# Patient Record
Sex: MALE | URBAN - METROPOLITAN AREA
[De-identification: names, ages, dates, MRNs, and addresses within clinical notes are randomized per-mention and may not be internally consistent; named-entity substitution may affect disease eponyms.]

---

## 2017-04-06 ENCOUNTER — TRANSFERRED RECORDS (OUTPATIENT)
Dept: HEALTH INFORMATION MANAGEMENT | Facility: CLINIC | Age: 67
End: 2017-04-06

## 2017-04-28 ENCOUNTER — TRANSFERRED RECORDS (OUTPATIENT)
Dept: HEALTH INFORMATION MANAGEMENT | Facility: CLINIC | Age: 67
End: 2017-04-28

## 2017-08-16 ENCOUNTER — TELEPHONE (OUTPATIENT)
Dept: FAMILY MEDICINE | Facility: CLINIC | Age: 67
End: 2017-08-16

## 2017-08-16 NOTE — TELEPHONE ENCOUNTER
Apryl called from [phone] regarding a patient needing an assessment to Establish Care with an Primary Provider for a [facility] in [facility]. Patient can leave at anytime once primary care has been set up. Apryl would like a call back.      Deborah Cano MA

## 2017-08-16 NOTE — TELEPHONE ENCOUNTER
Spoke with Apryl who states TC was waiting to hear back on which  patient was going to be admitted to before scheduling assessment.      Patient will be admitted to [facility] in [place].    Routing to .    Melody Goldstein RN

## 2017-08-21 ENCOUNTER — TELEPHONE (OUTPATIENT)
Dept: FAMILY MEDICINE | Facility: CLINIC | Age: 67
End: 2017-08-21

## 2017-08-21 NOTE — TELEPHONE ENCOUNTER
Mera from [facility] in [place] called to check on the status of scheduling an assessment for this patient. Please call. Phone # [phone]    Moira Gresham  [facility]

## 2017-08-21 NOTE — TELEPHONE ENCOUNTER
Patient currently at [facility] and is not a [facility] patient. The [facility] provider would like him to have primary care set up prior to discharging him to [facility].  Okay per Sabina Sahu NP, to do assessment at [facility].  Scheduled assessment with Mera from [facility], who is going to inform pt and [facility] staff of the plan.  Mera will also be at the assessment on 8/29.    See paper records from [place] and [facility] in [facility] office.    Mera said she call back with the pt's room number at [facility] before our visit next week.    Mariaelena Greer RN

## 2017-08-29 ENCOUNTER — ALLIED HEALTH/NURSE VISIT (OUTPATIENT)
Dept: FAMILY MEDICINE | Facility: CLINIC | Age: 67
End: 2017-08-29
Payer: MEDICARE

## 2017-08-29 DIAGNOSIS — J44.9 COPD (CHRONIC OBSTRUCTIVE PULMONARY DISEASE) (H): Primary | ICD-10-CM

## 2017-08-29 DIAGNOSIS — J96.10 CHRONIC RESPIRATORY FAILURE (H): ICD-10-CM

## 2017-08-29 PROCEDURE — 99207 ZZC NO CHARGE NURSE ONLY: CPT

## 2017-08-29 NOTE — Clinical Note
This pt was at [facility] for months, transferred to [facility] and is discharging to [facility] in [facility] next week. We have some records from [facility] and have requested records from [facility], but before that the pt reports not having much primary care.  Mariaelena

## 2017-08-29 NOTE — MR AVS SNAPSHOT
After Visit Summary   8/29/2017    Vlad Gleason    MRN: [id number]           Patient Information     Date Of Birth          1950        Visit Information        Provider Department      8/29/2017 10:00 AM COCC NURSE [facility]        Today's Diagnoses     COPD (chronic obstructive pulmonary disease) (H)    -  1    Chronic respiratory failure (H)           Follow-ups after your visit        Your next 10 appointments already scheduled     Sep 11, 2017  9:45 AM CDT   New Visit with BUBBA Owens CNP   [facility] ([facility])    [address] [place] [address]  [address] [place] [address] [id number] [phone]            Sep 11, 2017  9:45 AM CDT   New Visit with Corinne E Melling, LMFT   [facility] ([facility])    [address] [place] [address]  [address] [place] [address] [id number] [phone]            Sep 14, 2017  2:00 PM CDT   Office Visit with Lilia Rojas [facility] ([facility])    [address] [place] [id number] [phone]           Bring a current list of meds and any records pertaining to this visit. For Physicals, please bring immunization records and any forms needing to be filled out. Please arrive 10 minutes early to complete paperwork.              Who to contact     If you have questions or need follow up information about today's clinic visit or your schedule please contact [facility] directly at [phone].  Normal or non-critical lab and imaging results will be communicated to you by MyChart, letter or phone within 4 business days after the clinic has received the results. If you do not hear from us within 7 days, please contact the clinic through MyChart or phone. If you have a critical or abnormal lab result, we will notify you by phone as soon as possible.  Submit refill requests  "through [employer] or call your pharmacy and they will forward the refill request to us. Please allow 3 business days for your refill to be completed.          Additional Information About Your Visit        Home Delivery Service (HDS)hart Information     [employer] lets you send messages to your doctor, view your test results, renew your prescriptions, schedule appointments and more. To sign up, go to [email or web address] . Click on \"Log in\" on the left side of the screen, which will take you to the Welcome page. Then click on \"Sign up Now\" on the right side of the page.     You will be asked to enter the access code listed below, as well as some personal information. Please follow the directions to create your username and password.     Your access code is: [email or web address]  Expires: 2017 12:23 PM     Your access code will  in 90 days. If you need help or a new code, please call your [facility] clinic or [phone].        Care EveryWhere ID     This is your Care EveryWhere ID. This could be used by other organizations to access your [facility] medical records  [id number]         Blood Pressure from Last 3 Encounters:   No data found for BP    Weight from Last 3 Encounters:   No data found for Wt              Today, you had the following     No orders found for display       Primary Care Provider    None Specified       No primary provider on file.        Equal Access to Services     REBECCA VALVERDE : Hadii german [place], waaxda luqadaha, qaybta kaalmada dorinda, ashanti llamas . So [facility] [phone].    ATENCIÓN: Si habla español, tiene a conway disposición servicios gratuitos de asistencia lingüística. Llame al [phone].    We comply with applicable federal civil rights laws and [place] laws. We do not discriminate on the basis of race, color, national origin, age, disability sex, sexual orientation or gender identity.            Thank you!     Thank you for choosing [facility] "  for your care. Our goal is always to provide you with excellent care. Hearing back from our patients is one way we can continue to improve our services. Please take a few minutes to complete the written survey that you may receive in the mail after your visit with us. Thank you!             Your Updated Medication List - Protect others around you: Learn how to safely use, store and throw away your medicines at www.disposemymeds.org.      Notice  As of 8/29/2017 12:23 PM    You have not been prescribed any medications.

## 2017-08-29 NOTE — PROGRESS NOTES
"SUBJECTIVE:                                                      Vlad Gleason is a 66-year-old male being seen at home for an RN/SW assessment to determine eligibility for the mobile Complex Care Clinic.      Patient was seen along with Mera, RN case manager, and Kathy, Director of Nursing, both from [facility].    Top medical concerns:   1. \"Bed sores on bottom\"  2. Open area on right flank. \"Did not heal after gall bladder surgery about a year ago\"     HOMEBOUND STATUS:  Patient needs the aid of supportive devices or assistance of others  Unable to leave home without considerable, taxing effort    FUNCTIONAL STATUS:  WHODAS:   WHODAS 2.0 TOTAL SCORES 8/29/2017   Total Score 41       Standing & walking - cannot bear weight  Eating - dysphagia, history of aspiration  Bathing - bed baths  Dressing - total assist  Toileting - supra-pubic catheter    Medications - nurses at facility manage meds    Managing finances - Vlad manages on his own, but would like assistance. Mera said she can assist him to establish a rep payee    Transportation: used to use medical transportation, including stretcher transport, before he was on a ventilator  Home Care: [facility] group home in [place] - 24/7 nursing care  Hearing and Vision: Patient wears corrective lenses  Safety: No falls reported  DME: Hospital Bed, [place], O2, Power chair, Ramps in use at home, W/C, cough assist, suction, vent  O2 and supplies through [facility]  Vent (patient reports he is only on vent at night     Fall Risk for Medicare Annual Wellness Home safety: Fallen 2 or more times in the past year?: No  Any fall with injury in the past year?: No      PSYCHOSOCIAL: Legal guardian/conservator/POA: currently independent. Would like his nephew, Breezy, to be his POA    Financial: [place] was starting process for rep payee and [facility] will help with that going forward  Insurance: Manda JIMÉNEZ  Communication Barrier: " N/A  Cultural/Spiritual Preferences: Christianity  Support System Patient/Caregivers: Nephew Breezy and his sister (pt's niece)  Living Situation: At [facility] now, hoping to discharge to [facility] in [facility] Care Team: none  Other: Patient does not have a waiver or any county assistance, per [facility] and [facility] nurses        MENTAL HEALTH/COGNITION:   Vlad has a history of anxiety and depression, but denies current issues with anxiety or worries.  He did endorse feeling down, sad and hopeless sometimes, but denies suicidal thoughts today.      Six Item Cognitive Impairment Test   (6CIT):      What year is it?                               Correct - 0 points    What month is it?                               Correct - 0 points      Give the patient an address to remember with five components:   [place] ( first and last name - 2 components)   [address]  (number and name of street - 2 components)   [place] ( city - 1 component)      About what time is it (within the hour)? Correct - 0 points    Count backwards from 20 to 1:   Correct - 0 points    Say the months of the year in reverse: Correct - 0 points    Repeat the address phrase:   1 error - 2 points    Total 6CIT Score:      2/28    Interpretation: The 6CIT uses an inverse score and questions are weighted to produce a total out of 28. Scores of 0-7 are considered normal and 8 or more significant.    Advantages The test has high sensitivity without compromising specificity even in mild dementia. It is easy to translate linguistically and culturally.  Disadvantages The main disadvantage is in the scoring and weighting of the test, which is initially confusing, however computer models have simplified this greatly.    Probability Statistics: At the 7/8 cut off: Overall figures sensitivity 90% specificity 100%, in mild dementia sensitivity = 78% , specificity = 100%    Copyright 2000 [facility], [facility], [place].  Courtesy of Dr. Todd Cole      Specialists:  none      HEALTH CARE GOALS:    Do you have a POLST? No: POLST reviewed with patient; information given to patient to review.  Do you have a Health Care Directive?: No: Advance care planning reviewed with patient; information given to patient to review.  There is no problem list on file for this patient.      No current outpatient prescriptions on file.      ASSESSMENT/PLAN:                                                      Due to the patient's medical and/or and behavioral health complexity they would require regularly scheduled primary care visits every 1-3 months LIST:  YES       Homebound Status:  Comment: Homebound  Plan: Complex Care Clinic    Functional status:  Comment: Needs assistance with all ADLs & IADLs   Plan: 24/7 care at [facility]    Mental health/Cognition:  Comment: No cognitive issues noted or reported. Depression & anxiety reported in [facility] notes. PHQ & JOSEFINA deferred to [facility] visit.  Pt denies suicidal thoughts today.  Plan: [facility] at first visit and as needed    Health Care Goals:  Comment: No health care directive. Patient would like to work on this with his nephew soon.  Left Honoring Choices information with patient today.  Plan: Have goals of care conversations at upcoming visits. Currently Full code       The patient qualifies for the Complex Care Clinic and is scheduled to establish care with Josie Bowman NP, on 9/11/17.  He/She will be reassessed in six months to determine ongoing eligibility for Complex Care services.

## 2017-08-29 NOTE — TELEPHONE ENCOUNTER
Assessment done today and the pt qualifies for Complex Care. He is scheduled to establish with Josie Bowman NP, on 9/11.    Mariaelena Greer RN

## 2017-12-13 ENCOUNTER — MEDICAL CORRESPONDENCE (OUTPATIENT)
Dept: HEALTH INFORMATION MANAGEMENT | Facility: CLINIC | Age: 67
End: 2017-12-13

## 2017-12-19 ENCOUNTER — DOCUMENTATION ONLY (OUTPATIENT)
Dept: FAMILY MEDICINE | Facility: CLINIC | Age: 67
End: 2017-12-19

## 2017-12-19 ENCOUNTER — TELEPHONE (OUTPATIENT)
Dept: FAMILY MEDICINE | Facility: CLINIC | Age: 67
End: 2017-12-19

## 2017-12-19 DIAGNOSIS — T81.89XS NON-HEALING SURGICAL WOUND, SEQUELA: ICD-10-CM

## 2017-12-19 DIAGNOSIS — J95.02 INFECTION OF TRACHEOSTOMY STOMA (H): Primary | ICD-10-CM

## 2017-12-19 DIAGNOSIS — J96.11 CHRONIC RESPIRATORY FAILURE WITH HYPOXIA AND HYPERCAPNIA (H): ICD-10-CM

## 2017-12-19 DIAGNOSIS — S31.000S WOUND OF SACRAL REGION, SEQUELA: ICD-10-CM

## 2017-12-19 DIAGNOSIS — R33.9 URINARY RETENTION: ICD-10-CM

## 2017-12-19 DIAGNOSIS — J96.12 CHRONIC RESPIRATORY FAILURE WITH HYPOXIA AND HYPERCAPNIA (H): ICD-10-CM

## 2017-12-19 DIAGNOSIS — F33.3 SEVERE EPISODE OF RECURRENT MAJOR DEPRESSIVE DISORDER, WITH PSYCHOTIC FEATURES (H): ICD-10-CM

## 2017-12-19 DIAGNOSIS — R13.10 DYSPHAGIA, UNSPECIFIED TYPE: ICD-10-CM

## 2017-12-19 NOTE — TELEPHONE ENCOUNTER
[facility] Family Medicine phone call message- general phone call:    Reason for call: Dr Mack and Dr Calero did a home visit today for pt, [facility]  would like clarification on the orders they left today. Please call [facility] at [phone].    Return call needed: Yes    OK to leave a message on voice mail? Yes    Primary language: English      needed? No    Call taken on December 19, 2017 at 12:47 PM by Anaid Bautista

## 2017-12-20 ENCOUNTER — TELEPHONE (OUTPATIENT)
Dept: FAMILY MEDICINE | Facility: CLINIC | Age: 67
End: 2017-12-20

## 2017-12-20 PROBLEM — T81.89XA SURGICAL WOUND, NON HEALING: Status: ACTIVE | Noted: 2017-12-20

## 2017-12-20 PROBLEM — I48.0 PAROXYSMAL ATRIAL FIBRILLATION (H): Status: ACTIVE | Noted: 2017-12-20

## 2017-12-20 PROBLEM — R33.9 URINARY RETENTION: Status: ACTIVE | Noted: 2017-12-20

## 2017-12-20 PROBLEM — F32.3 MAJOR DEPRESSIVE DISORDER WITH PSYCHOTIC FEATURES (H): Status: ACTIVE | Noted: 2017-12-20

## 2017-12-20 PROBLEM — J96.11 CHRONIC RESPIRATORY FAILURE WITH HYPOXIA AND HYPERCAPNIA (H): Status: ACTIVE | Noted: 2017-12-20

## 2017-12-20 PROBLEM — R13.10 DYSPHAGIA: Status: ACTIVE | Noted: 2017-12-20

## 2017-12-20 PROBLEM — S31.000A SACRAL WOUND: Status: ACTIVE | Noted: 2017-12-20

## 2017-12-20 PROBLEM — E66.2 OBESITY HYPOVENTILATION SYNDROME (H): Status: ACTIVE | Noted: 2017-12-20

## 2017-12-20 PROBLEM — J96.12 CHRONIC RESPIRATORY FAILURE WITH HYPOXIA AND HYPERCAPNIA (H): Status: ACTIVE | Noted: 2017-12-20

## 2017-12-20 NOTE — TELEPHONE ENCOUNTER
[facility] Family Medicine phone call message- general phone call:    Reason for call: They need clarification on a prescription that was sent over yesterday.     Return call needed: Yes    OK to leave a message on voice mail? Yes    Primary language: English      needed? No    Call taken on December 20, 2017 at 3:08 PM by Maribeth Cai

## 2017-12-20 NOTE — TELEPHONE ENCOUNTER
Called [facility] pharmacy and clarified orders from home visit on 12/19/17.  See the documentation for that encounter for further details.    Ximena Calero MD PGY-3  [facility]  12/20/2017

## 2017-12-20 NOTE — PROGRESS NOTES
Subjective: Vlad Gleason is a 67 year old who is seen at home for follow up visit today.  Seen at [address] [place] [address] .    Also present at visit include home nurse Melisa.    Acute concerns today include:   Patient is a new patient to [facility] and home visits.  He has been on a ventilator since October 2016, after which she was discharged to [facility] for long-term rehabilitation in October 2016.  He stated [facility] until July 2017, at which time he was transferred to [facility] for third further management.  He was discharged from [facility] on December 12, 2017 2 his current living situation.  He receives home care at the [facility] in which she is currently staying.    Nursing staff would like us to evaluate him today.  Their main concern is there is an infection at his tracheostomy site.  They note that patient has had increased drainage and erythema surrounding the tracheostomy for the last couple of days.  The drainage is greenish yellow in color and is thick.  The patient denies any fevers.  They have been having to clean out the tracheostomy more frequently than usual.  Is unclear if the patient has an ear nose and throat physician or pulmonologist.    Other concerns is the need more medications for his anxiety.  Patient has a history of severe depression with some psychotic features.  It was noted in his discharge summary from health [facility] that he was at times encephalopathic.  His current home regimen includes Seroquel, melatonin, venlafaxine.  Nursing staff is wondering if they can have a as needed medication for the patient's anxiety.  Patient states that he does feel worried often and that it inhibits his daily living.    Lastly they need more medications for his wounds.  Patient has a sacral decubitus ulcer due to his mobility.  He also has an nonhealing surgical wound on the right side of his chest that has been there for quite some to time.  There is  no evidence of infection at these sites at this time.  Chronic and Past Medical Problems include:  Patient Active Problem List   Diagnosis     Chronic respiratory failure with hypoxia and hypercapnia (H)     Obesity hypoventilation syndrome (H)     Dysphagia     Urinary retention     Sacral wound     Major depressive disorder with psychotic features (H)     Paroxysmal atrial fibrillation (H)     Surgical wound, non healing     Social History:   Current activities:  New to the group [place], recently moved here from [facility]  Support services:  Receives 24 hour care at [facility]  PMHX/PSHX/MEDS/ALLERGIES/SHX/FHX reviewed and updated in Epic.   MEDICATION LIST:  Will be scanned into the chart once receive the records from his [facility] with updated medication list.  Medications are managed by:  Nursing staff  Patient uses a pill box to manage their medications:  Yes   Patient has questions, concerns, or potential side effects/interactions from their medications:  Yes  GERIATRIC ROS:    Do you have difficulty getting around, watching TV or reading because of poor eyesight? No, immobile but not due to eye sight  Can you hear normal conversational voice? Yes   Do you use hearing aides? No   Do you have problems with your memory? No   Do you often feel sad or depressed? Yes   Have you unintentionally lost weight in the last 6 months?  No   Do you have trouble with control of your bladder? Has suprapubic catheter  Do you have trouble with control of your bowels?  No   Have you had any falls in the past year? No     GENERAL ROS:   General: No unexplained weight gain or loss; adequate sleep pattern.  Resp: No worsening shortness of breath, cough or hemoptysis.   GI: No worsening constipation, no diarrhea, no nausea or vomiting  : Voiding independently with no significant incontinence   Skin:   Concern for skin around tracheostomy site. No  new areas of new skin breakdown or worrisome rashes  Extremities:  No pain,  extremity weakness or balance troubles    Objective:   124/68, Pulse 64, Temp 97.6, O2 93% on room air (speaking valve)  Gen: Laying in bed, no acute distress   HEENT: Head is atraumatic. Tracheostomy with very mild erythema of the skin surrounding the stoma.  Minimal amount of yellowish drainage of the skin surrroudning the stoma.  CV: RRR - no murmurs, rubs, or gallops,   Pulm: CTAB, no wheezes/rales/rhonchi, good air entry   ABD: soft, nontender, BS intact  Extrem: no cyanosis, edema or clubbing   Psych: Euthymic, but does admit to anxiety  Neuro: Bedbound    Preventative Screening:   Vaccinations reviewed and up to date Not available, will need to sign care everywhere for [facility]  Get up and Go test:  Not applicable  Additional Recommended Screening: None     Assessment/ Plan:  (J95.02) Infection of tracheostomy stoma (H)  (primary encounter diagnosis)  Comment: Patient has had a tracheostomy since October 2016.  He has no previous history of complication with the tracheostomy.  However staff noted some erythema and breakdown of the skin surrounding the stoma for the last 2 days.  They have also noted some plugging of the stoma.  Prior evaluation, it appears to be a superficial infection of the skin surrounding the stoma rather than on the stoma itself.  The case was discussed with an on-call ENT physician who agreed that it was unlikely that the stoma was actually infected.  Plan: We will treat topically with 0.25% acetic acid soaked dressings.  Dressings are to be changed 3 times daily and continued for 1 week..  If this does not resolve the superficial infection by December 26, will start Keflex 500 mg 3 times daily ×7 days    (J96.11,  J96.12) Chronic respiratory failure with hypoxia and hypercapnia (H)  Comment: Patient has chronic respiratory failure due to obesity hypoventilation syndrome as well as obstructive sleep apnea.  He has been mechanically ventilated at night since October 2016.  He is  currently stable on his ventilator.  He uses a speaking valve during the day and does not require oxygen supplementation during the day.   Plan:  Continue current vent settings    (F33.3) Severe episode of recurrent major depressive disorder, with psychotic features (H)  Comment: Patient has known depression and current home regimen includes Seroquel 100 mg at bedtime, melatonin, venlafaxine.  He has had issues with anxiety since being admitted and staff are asking for an as needed medication.  Plan: Continue Seroquel, melatonin, venlafaxine at current doses.  Will add hydroxyzine 25-50 mg 4 times daily as needed for anxiety    (R13.10) Dysphagia, unspecified type  Comment: Patient is supposed to be on a thickened liquid diet, however he is noncompliant with this.  Plan: Continue to encourage compliance with thickened liquids    (R33.9) Urinary retention  Comment: Patient has a history of urinary retention and currently has a suprapubic catheter in place.  Catheter appears to be functioning normally at this time  Plan: Routine catheter care    (T81.89XS) Non-healing surgical wound, sequela  (S31.000S) Wound of sacral region, sequela  Comment: Patient has decubitus ulcer on the sacrum as well as a nonhealing surgical wound.  Nursing staff have multiple recommendations for wound care.  Orders were placed at the facility.  Plan: Continue wound care per nursing staff.  Will need care everywhere signed from [facility] at that time.  We will also need to complete rest of  RECOMMENDED FOLLOW UP:  1 month  History such as family history, social history.  There was not time to complete all of this today given the number of orders that we needed to sort through and dealing with the stoma infection.    Level of Service:  [id number]    Staffed with Dr. Mack.    Ximena Calero MD PGY-3  [facility]  12/19/2017

## 2017-12-21 ENCOUNTER — TELEPHONE (OUTPATIENT)
Dept: FAMILY MEDICINE | Facility: CLINIC | Age: 67
End: 2017-12-21

## 2017-12-21 NOTE — TELEPHONE ENCOUNTER
Silvercel (for pt's wound) is not covered by his insurance. Do you want to send an alternative? Per nurseSumaya.    Routed to Dr. Mack and Dr. Thompson. /MONICA Salazar

## 2017-12-21 NOTE — TELEPHONE ENCOUNTER
[facility] Family Medicine phone call message- general phone call:    Reason for call: His insurance does not cover one of his wound care kits.Please give a call back.    Return call needed: Yes    OK to leave a message on voice mail? Yes    Primary language: English      needed? No    Call taken on December 21, 2017 at 10:37 AM by Latricia Mercado

## 2017-12-21 NOTE — PROGRESS NOTES
Preceptor attestation:  Patient seen and discussed with the resident. Assessment and plan reviewed with resident and agreed upon.  Supervising physician: Jonathan Mack  [facility]

## 2017-12-22 NOTE — TELEPHONE ENCOUNTER
I called [facility] and spoke to pharmacist.  Clarified the order for Vistaril prn anxiety.  Still some confusion on the ordered spray cleanser to wounds and prn saline nebs.    MB

## 2017-12-27 ENCOUNTER — TELEPHONE (OUTPATIENT)
Dept: FAMILY MEDICINE | Facility: CLINIC | Age: 67
End: 2017-12-27

## 2017-12-27 NOTE — TELEPHONE ENCOUNTER
[facility] Family Medicine phone call message- general phone call:    Reason for call: The pharmacy said the hydroxyzine is not covered by insurance they need something else.    Return call needed: Yes    OK to leave a message on voice mail? Yes    Primary language: English      needed? No    Call taken on December 27, 2017 at 3:48 PM by Latricia Mercado

## 2017-12-29 ENCOUNTER — TELEPHONE (OUTPATIENT)
Dept: FAMILY MEDICINE | Facility: CLINIC | Age: 67
End: 2017-12-29

## 2017-12-30 NOTE — TELEPHONE ENCOUNTER
Answering service page:    Olena Peralta at [facility] with a phone number of [phone]  Reason for call: Need for emergent prescription for narcotic    Called Olean back at above number who reported the patient will be running out of his oxycodone prescription prior to Tuesday.  She is requesting to have this refilled so that they do not go the holiday weekend without his as needed pain medication.  Discussed with her the normal protocol with respect to narcotics being called in overnight and on weekends.  Talked with on call attending physician, Dr. Crespo, who recommended I contact with Dr. Jonathan Mack who is the patient's primary care provider.  Dr. Mack did see the patient with Dr. Calero on  12/19.  The patient is new to our clinic in home visits.  Medication list is not listed in our clinic chart, and this is in process to be updated.  [facility] database checked and he received a small prescription approximately 14 days ago.    After brief discussion with Dr. Mack he was okay for a small prescription to make it through to Wednesday morning.  Dr. Mack is back in clinic on Tuesday, so will look over continued prescription for his oxycodone at that time.    Called [facility] pharmacy and phoned in the following prescription.  Oxycodone 5 mg #20 Sig: Take 1 tablet every 4 hours as needed for pain, no refills    Called [facility] back and spoke with staff member to discuss that a small prescription will be called into the pharmacy for them to  tomorrow to make it through Tuesday.  Discussed with them to call clinic, or that they will hear from clinic in regards to further refills so he does not run out of this medication.     Tisha Conti, DO  PGY2

## 2018-01-02 NOTE — TELEPHONE ENCOUNTER
Phone call placed to his nurse at [facility].  I also talked with Vlad.  Verbal order given to  1) Discontinue Hydroxyzine (not covered, and overall anxiety less)  2) Use Tylenol BID 30-60 minutes before dressing changes  3) ONLY use oxycodone for severe, unrelenting pain.    This verbal order will be faxed to [facility] for me to sign and fax back.

## 2018-01-04 ENCOUNTER — TELEPHONE (OUTPATIENT)
Dept: FAMILY MEDICINE | Facility: CLINIC | Age: 68
End: 2018-01-04

## 2018-01-04 NOTE — TELEPHONE ENCOUNTER
Italia calling from [facility] regarding patients wound and mattress and questions who follows the patient. It was explained that the patient is a home visit patient and has around the clock nursing in the home. Phone number given as the number she had was not correct. She will contact the facility. /MONICA Talavera

## 2018-01-04 NOTE — TELEPHONE ENCOUNTER
[facility] phone call message- general phone call:    Reason for call: She is calling to find out who is following this patient for wound care.    Return call needed: Yes    OK to leave a message on voice mail? Yes    Primary language: English      needed? No    Call taken on January 4, 2018 at 9:36 AM by Latricia Mercado

## 2018-01-05 ENCOUNTER — TELEPHONE (OUTPATIENT)
Dept: FAMILY MEDICINE | Facility: CLINIC | Age: 68
End: 2018-01-05

## 2018-01-05 DIAGNOSIS — Z43.0 TRACHEOSTOMY CARE (H): Primary | ICD-10-CM

## 2018-01-05 NOTE — TELEPHONE ENCOUNTER
Nurse spoke with Dr. Thompson and Hadley regarding request and Rx is ok to send. /MONICA Talavera Routed to Dr. Butcher

## 2018-01-05 NOTE — TELEPHONE ENCOUNTER
Melisa nurse calling from [facility] states that patient was seen by a Respiratory therapist and she states that there is a nodule and redness noted on his trach and she would like an order for silver nitrate applicator to be applied once or twice daily sent to [place] pharmacy. /MONICA Talavera  Routed to Dr. Mack

## 2018-01-08 ENCOUNTER — TELEPHONE (OUTPATIENT)
Dept: FAMILY MEDICINE | Facility: CLINIC | Age: 68
End: 2018-01-08

## 2018-01-08 NOTE — TELEPHONE ENCOUNTER
I called and spoke to Pierre with [facility] at [address] [place] [address].    I ordered doxy 100 mg BID x 7 and a wound clx.    DB

## 2018-01-08 NOTE — TELEPHONE ENCOUNTER
[facility] Family Medicine phone call message- general phone call:    Reason for call: He needs a call back re his trach site is infected he has a course of antibiotics that were completed but the infection is still there.    Return call needed: Yes    OK to leave a message on voice mail? Yes    Primary language: English      needed? No    Call taken on January 8, 2018 at 2:50 PM by Latricia Mercado

## 2018-01-10 ENCOUNTER — MEDICAL CORRESPONDENCE (OUTPATIENT)
Dept: HEALTH INFORMATION MANAGEMENT | Facility: CLINIC | Age: 68
End: 2018-01-10

## 2018-01-15 ENCOUNTER — TELEPHONE (OUTPATIENT)
Dept: FAMILY MEDICINE | Facility: CLINIC | Age: 68
End: 2018-01-15

## 2018-01-15 DIAGNOSIS — T81.89XS NON-HEALING SURGICAL WOUND, SEQUELA: ICD-10-CM

## 2018-01-15 DIAGNOSIS — J96.11 CHRONIC RESPIRATORY FAILURE WITH HYPOXIA AND HYPERCAPNIA (H): Primary | ICD-10-CM

## 2018-01-15 DIAGNOSIS — F33.3 SEVERE EPISODE OF RECURRENT MAJOR DEPRESSIVE DISORDER, WITH PSYCHOTIC FEATURES (H): ICD-10-CM

## 2018-01-15 DIAGNOSIS — J96.12 CHRONIC RESPIRATORY FAILURE WITH HYPOXIA AND HYPERCAPNIA (H): Primary | ICD-10-CM

## 2018-01-15 DIAGNOSIS — S31.000S WOUND OF SACRAL REGION, SEQUELA: ICD-10-CM

## 2018-01-15 NOTE — TELEPHONE ENCOUNTER
Nurse calling requesting medication for anxiety. Pt refuses to get out of bed. Pt has wounds on bottom and would like referral for wound care nursing to assess and eval.   Dressing change done once a day currently. Wounds not getting better. This is the first time this nurse is caring for this pt and she is reading off notes from nurse on previous shift.     Routed to Dr. Mack. /MONICA Salazar

## 2018-01-15 NOTE — TELEPHONE ENCOUNTER
[facility] Family Medicine phone call message- general phone call:    Reason for call: SHe is in the home with the patient and need to talk to a nurse.     Return call needed: Yes    OK to leave a message on voice mail? Yes    Primary language: English      needed? No    Call taken on January 15, 2018 at 11:21 AM by Latricia Mercado

## 2018-01-16 RX ORDER — QUETIAPINE FUMARATE 100 MG/1
100 TABLET, FILM COATED ORAL 2 TIMES DAILY
Qty: 60 TABLET | Refills: 11 | Status: SHIPPED | OUTPATIENT
Start: 2018-01-16 | End: 2020-01-01

## 2018-01-17 ENCOUNTER — TELEPHONE (OUTPATIENT)
Dept: FAMILY MEDICINE | Facility: CLINIC | Age: 68
End: 2018-01-17

## 2018-01-17 DIAGNOSIS — J96.11 CHRONIC RESPIRATORY FAILURE WITH HYPOXIA AND HYPERCAPNIA (H): ICD-10-CM

## 2018-01-17 DIAGNOSIS — Z97.8 USES FEEDING TUBE: ICD-10-CM

## 2018-01-17 DIAGNOSIS — J96.12 CHRONIC RESPIRATORY FAILURE WITH HYPOXIA AND HYPERCAPNIA (H): ICD-10-CM

## 2018-01-17 DIAGNOSIS — J95.02 INFECTION OF TRACHEOSTOMY STOMA (H): Primary | ICD-10-CM

## 2018-01-17 NOTE — TELEPHONE ENCOUNTER
[facility] Family Medicine phone call message- general phone call:    Reason for call: They called about ten days ago because the patient had an infection. He was prescribed an antibiotic but that is gone now  But the infections is still there.    Return call needed: Yes    OK to leave a message on voice mail? Yes    Primary language: English      needed? No    Call taken on January 17, 2018 at 2:32 PM by Latricia Mercado

## 2018-01-17 NOTE — TELEPHONE ENCOUNTER
Angi has looped me into this in regards to orders for home care.  I have called [facility] to see if they are able to see patient for the wound care if we place the orders. She states they are already seeing patient for wound care, however, if we need to add extra care or additional needs we could send another order.     We would need to place the order for home care, reasoning, wound care to evaluate and treat. The nurse also states that we would need to place an order to the pharmacy for wound care supplies that would be needed. (To [facility], phone:[phone]  Fax: [phone])    Once orders are placed I can fax the orders to:  [facility]  Fax:[phone]    If there is anything additionally needed, please let me know what I can do.   Deann  01/17/18

## 2018-01-17 NOTE — TELEPHONE ENCOUNTER
Dr. Mack,  I've spoken with [facility] wound care, they told me the referral need a Home Care referral.   They are unable to do a home care referral without a recent face to face for the current issue being referred for.   In order to move forward with the referral we would need to have-  1)a face to face encounter with patient  2)assess the issue needed for wound care  3)place a Home Care referral with information.    I had spoken with Patricio Rodriguez, who had spoken with the nurse who requested this referral. She states that the nurses at [facility] need a more specialized home care nurse to evaluate the wounds.   I would suggest doing a home visit for this face to face, however, it seems that there may not be home visits this upcoming Tuesday as usual. I am not sure how much this concern can be delayed or if patient is able to come in clinic for a visit.   Please advise.

## 2018-01-19 ENCOUNTER — DOCUMENTATION ONLY (OUTPATIENT)
Dept: FAMILY MEDICINE | Facility: CLINIC | Age: 68
End: 2018-01-19

## 2018-01-19 ENCOUNTER — TELEPHONE (OUTPATIENT)
Dept: FAMILY MEDICINE | Facility: CLINIC | Age: 68
End: 2018-01-19

## 2018-01-19 ENCOUNTER — DOCUMENTATION ONLY (OUTPATIENT)
Dept: PSYCHOLOGY | Facility: CLINIC | Age: 68
End: 2018-01-19

## 2018-01-19 DIAGNOSIS — F33.3 SEVERE EPISODE OF RECURRENT MAJOR DEPRESSIVE DISORDER, WITH PSYCHOTIC FEATURES (H): ICD-10-CM

## 2018-01-19 DIAGNOSIS — T81.89XS NON-HEALING SURGICAL WOUND, SEQUELA: Primary | ICD-10-CM

## 2018-01-19 DIAGNOSIS — J96.11 CHRONIC RESPIRATORY FAILURE WITH HYPOXIA AND HYPERCAPNIA (H): ICD-10-CM

## 2018-01-19 DIAGNOSIS — J96.12 CHRONIC RESPIRATORY FAILURE WITH HYPOXIA AND HYPERCAPNIA (H): ICD-10-CM

## 2018-01-19 DIAGNOSIS — S31.000S WOUND OF SACRAL REGION, SEQUELA: ICD-10-CM

## 2018-01-19 RX ORDER — DOXYCYCLINE 100 MG/1
100 CAPSULE ORAL 2 TIMES DAILY
Qty: 20 CAPSULE | Refills: 0 | Status: SHIPPED | OUTPATIENT
Start: 2018-01-19 | End: 2018-04-05

## 2018-01-19 NOTE — PROGRESS NOTES
Interprofessional Team Consultation Note     Requesting Provider: Dr. Mack    Consultants:  Behavioral Health: Dr. Gaytan  Care Coordination: Angi Grace  PharmD: Dr. Jamil  Family Medicine Physicians: Dr. Sterling    IDENTIFYING DATA/REASON FOR REFERRAL:  Vlad Gleason is 67 year oldmale who is cared for by Dr. Mack.? Dr. Mack is requesting consultation related to wound care and in house mental health services. Relevant clinical information obtained from requesting PCP, interprofessional team members noted above and review of the medical record. ???    Topics Discussed:  PCP advised that he would like to get a wound care team in to see the patient.  Additionally patient would benefit from having in home mental health services.  Recommendations/Action Items:  Dr. Mack will place referral for [facility] wound care to visit the patient at home.  Dr. Mack will place a referral to [facility] to try and get mental health services out to patients home.     Lesly Hutton     Disclaimer  The above treatment recommendations are based on consultation with the patient's primary care provider and a review of relevant information in EPIC.? I have not personally examined the patient.? All recommendations should be implemented with considerations of the patient's relevant prior history and current clinical status.  Please contact me with any questions about the care of this patient.

## 2018-01-19 NOTE — PATIENT INSTRUCTIONS
A message has been sent to the behavioral health team to advise for mental health referral. See documentation encounter for details.  Deann    01/19/18    Orders faxed to:  [facility]  Fax:[phone]  Deann  01/23/18

## 2018-01-19 NOTE — PROGRESS NOTES
Behavioral Health Team,    Patient is being referred for mental health services. Please advise if we are able to see patient for in house treatment or if a community option would be best.    Thank you.     Deann  Care Coordinator

## 2018-01-23 ENCOUNTER — TELEPHONE (OUTPATIENT)
Dept: FAMILY MEDICINE | Facility: CLINIC | Age: 68
End: 2018-01-23

## 2018-01-23 ENCOUNTER — TRANSFERRED RECORDS (OUTPATIENT)
Dept: HEALTH INFORMATION MANAGEMENT | Facility: CLINIC | Age: 68
End: 2018-01-23

## 2018-01-23 DIAGNOSIS — J95.02 INFECTION OF TRACHEOSTOMY STOMA (H): Primary | ICD-10-CM

## 2018-01-23 NOTE — TELEPHONE ENCOUNTER
[facility] Family Medicine phone call message- general phone call:    Reason for call: She is in the home with the patient and would like to talk to a nurse.    Return call needed: Yes    OK to leave a message on voice mail? Yes    Primary language: English      needed? No    Call taken on January 23, 2018 at 9:48 AM by Latricia Mrecado

## 2018-01-23 NOTE — TELEPHONE ENCOUNTER
"Please phone a verbal order for \"silver nitrate to tracheostomy granuloma prn\"      Jonathan Mack MD  "

## 2018-01-23 NOTE — TELEPHONE ENCOUNTER
MONICA Parisi, is requesting silver nitrate on pt's granuloma. This has been effective on other pt and would like verbal orders from Dr. Mack if this would be appropriate.     Routed to Dr. Mack. /MONICA Salazar

## 2018-01-24 ENCOUNTER — TELEPHONE (OUTPATIENT)
Dept: FAMILY MEDICINE | Facility: CLINIC | Age: 68
End: 2018-01-24

## 2018-01-24 NOTE — TELEPHONE ENCOUNTER
[facility] Family Medicine phone call message- general phone call:    Reason for call: He needs clarification of orders for the silver nitrate prescription the pharmacy needs to know the strength they have two different strength  and one  that is half percent and one that is 1 percent .    Return call needed: Yes    OK to leave a message on voice mail? Yes    Primary language: English      needed? No    Call taken on January 24, 2018 at 1:28 PM by Latricia Mercado  \

## 2018-01-30 ENCOUNTER — TELEPHONE (OUTPATIENT)
Dept: FAMILY MEDICINE | Facility: CLINIC | Age: 68
End: 2018-01-30

## 2018-01-30 ENCOUNTER — MEDICAL CORRESPONDENCE (OUTPATIENT)
Dept: HEALTH INFORMATION MANAGEMENT | Facility: CLINIC | Age: 68
End: 2018-01-30

## 2018-01-30 NOTE — TELEPHONE ENCOUNTER
Phone call to Melisa and she state she would like an order for irrigation of patient supra pubic once daily as needed. She also states that the patient is bypassing and yesterday blood was noted in his tubing and she questions if Dr. Mack would like to send in a medication for him. Please advise. /MONICA Talavera  Routed to Dr. Mack

## 2018-01-30 NOTE — TELEPHONE ENCOUNTER
Referred by Dr. Mack to update medication list based on MAR from [facility]    Medication list has been updated.     Anayeli Chen, PharmD Resident

## 2018-01-30 NOTE — TELEPHONE ENCOUNTER
[facility] Family Medicine phone call message- general phone call:    Reason for call: Calling on orders that were faxed over.    Return call needed: Yes    OK to leave a message on voice mail? Yes    Primary language: English      needed? No    Call taken on January 30, 2018 at 9:01 AM by Jocy Borges

## 2018-01-30 NOTE — PROGRESS NOTES
I have spoken with Vlad's nurse and gave her the information below. She will call [facility] to connect with in home services.   Deann  01/30/18

## 2018-01-30 NOTE — PROGRESS NOTES
Review of the patient's medical record and the referral order indicates the current referral is for in-home services, with the plan being to try to get the patient connected with [facility]. Please reach out to the patient to facilitate referral to [facility] (The phone number listed for home-based intake services on their web site is [phone])    Nathaniel Lombardi, Ph.D.  Behavioral Health Fellow

## 2018-01-30 NOTE — TELEPHONE ENCOUNTER
"Please contact Melisa at Vlad's residence and let her know:     \"report of bypassing and blood noted.  Continue to monitor for now. Please contact [facility] is this persists into Thursday Feb 1st, 2018.     Jonathan Mack MD\"     Jonathan Mack   "

## 2018-02-01 ENCOUNTER — TELEPHONE (OUTPATIENT)
Dept: FAMILY MEDICINE | Facility: CLINIC | Age: 68
End: 2018-02-01

## 2018-02-01 RX ORDER — VENLAFAXINE 75 MG/1
100 TABLET ORAL 2 TIMES DAILY
COMMUNITY
Start: 2018-01-30

## 2018-02-01 RX ORDER — CARVEDILOL 3.12 MG/1
3.12 TABLET ORAL 2 TIMES DAILY WITH MEALS
Qty: 60 TABLET | Refills: 1 | COMMUNITY
Start: 2018-01-30 | End: 2019-06-25

## 2018-02-01 RX ORDER — LANOLIN ALCOHOL/MO/W.PET/CERES
3 CREAM (GRAM) TOPICAL AT BEDTIME
COMMUNITY
Start: 2018-01-30

## 2018-02-01 RX ORDER — OLOPATADINE HYDROCHLORIDE 1 MG/ML
1 SOLUTION/ DROPS OPHTHALMIC DAILY
Qty: 5 ML | Refills: 3 | COMMUNITY
Start: 2018-01-30

## 2018-02-01 RX ORDER — EPINEPHRINE 1 MG/ML
0.3 INJECTION, SOLUTION, CONCENTRATE INTRAVENOUS ONCE
Qty: 0.3 ML | Refills: 0 | COMMUNITY
Start: 2018-01-30

## 2018-02-01 RX ORDER — FLUTICASONE PROPIONATE 50 MCG
1 SPRAY, SUSPENSION (ML) NASAL DAILY
Qty: 1 BOTTLE | Refills: 11 | COMMUNITY
Start: 2018-01-30

## 2018-02-01 RX ORDER — POLYETHYLENE GLYCOL 3350
17 POWDER (GRAM) MISCELLANEOUS DAILY
COMMUNITY
Start: 2018-01-30

## 2018-02-01 RX ORDER — OXYCODONE HYDROCHLORIDE 5 MG/1
5 TABLET ORAL EVERY 4 HOURS PRN
Qty: 18 TABLET | Refills: 0 | COMMUNITY
Start: 2018-02-01 | End: 2018-02-08

## 2018-02-01 RX ORDER — BRIMONIDINE TARTRATE 2 MG/ML
1 SOLUTION/ DROPS OPHTHALMIC DAILY
Qty: 1 BOTTLE | COMMUNITY
Start: 2018-01-30 | End: 2019-06-25

## 2018-02-01 RX ORDER — ASPIRIN 81 MG/1
81 TABLET, CHEWABLE ORAL DAILY
Qty: 108 TABLET | Refills: 3 | COMMUNITY
Start: 2018-02-01

## 2018-02-01 RX ORDER — DOCUSATE SODIUM 50 MG/5ML
100 LIQUID ORAL 2 TIMES DAILY
Qty: 300 ML | COMMUNITY
Start: 2018-01-30

## 2018-02-01 RX ORDER — ALBUTEROL SULFATE 0.83 MG/ML
SOLUTION RESPIRATORY (INHALATION)
Qty: 25 VIAL | Refills: 0 | COMMUNITY
Start: 2018-01-30

## 2018-02-01 RX ORDER — MULTIVITAMIN,THERAPEUTIC
1 TABLET ORAL DAILY
Refills: 0 | COMMUNITY
Start: 2018-01-30

## 2018-02-01 RX ORDER — LORATADINE 10 MG/1
10 TABLET ORAL DAILY
Qty: 30 TABLET | Refills: 1 | COMMUNITY
Start: 2018-01-30

## 2018-02-01 RX ORDER — NITROGLYCERIN 0.4 MG/1
TABLET SUBLINGUAL
Qty: 25 TABLET | Refills: 0 | COMMUNITY
Start: 2018-01-30

## 2018-02-01 RX ORDER — BUDESONIDE 0.5 MG/2ML
0.5 INHALANT ORAL 2 TIMES DAILY
COMMUNITY
Start: 2018-01-30 | End: 2019-06-25

## 2018-02-01 RX ORDER — ASCORBIC ACID 500 MG
500 TABLET ORAL DAILY
Qty: 30 TABLET | COMMUNITY
Start: 2018-01-30

## 2018-02-01 NOTE — TELEPHONE ENCOUNTER
Patient s case reviewed. I agree with the written assessment and plan of care.    Raisa Jamil, PharmD.

## 2018-02-01 NOTE — TELEPHONE ENCOUNTER
[facility] Family Medicine phone call message- general phone call:    Reason for call: Needs orders for nursing visits for wound care.     Return call needed: Yes    OK to leave a message on voice mail? Yes    Primary language: English      needed? No    Call taken on February 1, 2018 at 1:37 PM by Anaid Bautista

## 2018-02-01 NOTE — TELEPHONE ENCOUNTER
Because of possible exposure Hugo questions if the patient can get prophylactic treated for the flu. Please. /MONICA Talavera  Routed to Dr. Mack

## 2018-02-02 NOTE — TELEPHONE ENCOUNTER
"Please contact caller:     \"Dr. Mack is not aware of all the exposures Vlad has had. If he has had significant exposure(s) let's start Tamiflu 75mf daily for 10 days.\"     Jonathan Mack MD      Phone call to [facility] and spoke with Melisa patient's nurse for today and inquired if the patient was around anyone directly that had the flu and Melisa was not aware that he had. Per Dr. Mack states unless patient was around anyone directly dx'd with the flu Tamiflu is not indicated at this time. Continue to watch for something. Information was given to Melisa who verbalizes understanding.     She also would like Dr. Mack to know that there has not been any bleeding with the irrigations. /MONICA Talavera  Routed to Dr. Mack  "

## 2018-02-06 ENCOUNTER — TRANSFERRED RECORDS (OUTPATIENT)
Dept: HEALTH INFORMATION MANAGEMENT | Facility: CLINIC | Age: 68
End: 2018-02-06

## 2018-02-07 ENCOUNTER — TELEPHONE (OUTPATIENT)
Dept: FAMILY MEDICINE | Facility: CLINIC | Age: 68
End: 2018-02-07

## 2018-02-07 DIAGNOSIS — S31.000S WOUND OF SACRAL REGION, SEQUELA: Primary | ICD-10-CM

## 2018-02-07 NOTE — TELEPHONE ENCOUNTER
[facility] Family Medicine phone call message- patient requesting a refill:    Full Medication Name: oxycodone     Dose: 5 mg     Pharmacy confirmed as   [facility] - [place], [place] - [address] [facility]  [address] [facility] [address]  Phone: [phone] Fax: [phone]    [facility] - [place], [place] - [address]  [address]  [facility] [address]  Phone: [phone] Fax: [phone]  : Yes    Additional Comments: the pt is bed ridden and can not come in the Dr visits him at the home and the home care provider would like to know how they can get this refilled without the pt coming in  [phone] is the number for the house       OK to leave a message on voice mail? Yes    Primary language: English      needed? No    Call taken on February 7, 2018 at 1:16 PM by Pierre Dukes

## 2018-02-08 RX ORDER — OXYCODONE HYDROCHLORIDE 5 MG/1
5 TABLET ORAL DAILY PRN
Qty: 30 TABLET | Refills: 0 | Status: SHIPPED | OUTPATIENT
Start: 2018-02-08 | End: 2018-05-31

## 2018-02-08 NOTE — TELEPHONE ENCOUNTER
Phone call. Vlad has required one 5mg oxycodone about one hour before sacral wound dressing change.  He ran out of his supply, and the dressing change was much more painful today.  Vlad would like to continue being given the Rx.  Denies side effects of constipation or thinking difficulties.  .pharmacy checked; no issues.    Rx for #30 oxycodone faxed.    Will attempt taper q month

## 2018-02-09 ENCOUNTER — TELEPHONE (OUTPATIENT)
Dept: FAMILY MEDICINE | Facility: CLINIC | Age: 68
End: 2018-02-09

## 2018-02-09 NOTE — TELEPHONE ENCOUNTER
Aracelis states she received the return fax this morning and now has what she needs. /MONICA Talavera

## 2018-02-09 NOTE — TELEPHONE ENCOUNTER
[facility] Family Medicine phone call message- general phone call:    Reason for call: She faxed over wound care recommendations on 02/07, she needs signed by Dr. Mack and sent back to her. She wanted to check the status.     Return call needed: Yes    OK to leave a message on voice mail? Yes    Primary language: English      needed? No    Call taken on February 9, 2018 at 8:12 AM by Maribeth Cai

## 2018-02-10 ENCOUNTER — TELEPHONE (OUTPATIENT)
Dept: FAMILY MEDICINE | Facility: CLINIC | Age: 68
End: 2018-02-10

## 2018-02-10 NOTE — TELEPHONE ENCOUNTER
Care facility calling for patient who is having increased amounts of gas. Had only a medium sized bowel movement instead of a typical large one. Apparently he is declining his colace but will take his miralax. No reported pain. No diarrhea. No recent illnesses. There appears to be hesitancy when offering a suppository. Staff reporting that patient is eating an excessive amount of cheese and other unhealthy foods today.     Recommendation:   Observe patient and call back if no improvement by tomorrow. Encourage patient to take the colace and miralax available to him.     Carlos Flores MD PGY3

## 2018-02-20 ENCOUNTER — DOCUMENTATION ONLY (OUTPATIENT)
Dept: FAMILY MEDICINE | Facility: CLINIC | Age: 68
End: 2018-02-20

## 2018-02-20 DIAGNOSIS — F41.9 ANXIETY: ICD-10-CM

## 2018-02-20 DIAGNOSIS — T83.010A SUPRAPUBIC CATHETER DYSFUNCTION, INITIAL ENCOUNTER (H): ICD-10-CM

## 2018-02-20 DIAGNOSIS — Z93.0 TRACHEOSTOMY IN PLACE (H): Primary | ICD-10-CM

## 2018-02-22 RX ORDER — DIPHENHYDRAMINE HCL 25 MG
25-50 TABLET ORAL EVERY 6 HOURS PRN
Qty: 60 TABLET | Refills: 1 | Status: SHIPPED | OUTPATIENT
Start: 2018-02-22

## 2018-02-22 NOTE — PROGRESS NOTES
Preceptor attestation:  Patient seen and discussed with the resident. Assessment and plan reviewed with resident and agreed upon.  Supervising physician: Jonathan Mack  [facility]

## 2018-02-22 NOTE — PROGRESS NOTES
"Subjective: Vlad Gleason is a 67 year old who is seen at home for follow up visit today.  Seen at [address] [place] [address] .    Also present at visit include group home staff, home nurse.  No family member.   Acute concerns today include: Patient staff reports patient has been anxious and also appearing short of breath especially with movement and turning in the bed.  Patient has not been able to get hydroxyzine due to coverage.  Patient also has had issues with episodes of a clogged tracheostomy tube.  As result of the club tracheostomy tube patient has had to have frequent replacements as well as attempts at cleaning equipment prior to having it replaced.  Patient has had episodes of \"urine bypassing\" his suprapubic catheter.  This also has had to be changed frequently due to increased amount of sediment in his urine.  Lastly, patient has been using 4 L of oxygen to assist with air hunger and maintain oxygen saturation.    Chronic and Past Medical Problems include:  Patient Active Problem List   Diagnosis     Chronic respiratory failure with hypoxia and hypercapnia (H)     Obesity hypoventilation syndrome (H)     Dysphagia     Urinary retention     Sacral wound     Major depressive disorder with psychotic features (H)     Paroxysmal atrial fibrillation (H)     Surgical wound, non healing       Social History:   Current activities: Patient is still at the group home and remains bedbound.  Support services: Patient receives 24-hour care at group [place]  Currently living family/friends: Family members currently not present at the visit  PMHX/PSHX/MEDS/ALLERGIES/SHX/FHX reviewed and updated in Epic.   MEDICATION LIST:  Current Outpatient Prescriptions   Medication     oxyCODONE IR (ROXICODONE) 5 MG tablet     albuterol (2.5 MG/3ML) 0.083% neb solution     aspirin 81 MG chewable tablet     brimonidine (ALPHAGAN) 0.2 % ophthalmic solution     budesonide (PULMICORT) 0.5 MG/2ML neb solution     carvedilol " "(COREG) 3.125 MG tablet     Docusate Sodium (DIOCTO) 150 MG/15ML LIQD     ferrous sulfate 220 (44 FE) MG/5ML ELIX     fluticasone (FLONASE) 50 MCG/ACT spray     loratadine (CLARITIN) 10 MG tablet     melatonin 3 MG tablet     olopatadine (PATANOL) 0.1 % ophthalmic solution     omeprazole (PRILOSEC) 2 mg/mL SUSP     polyethylene glycol 3350 POWD     multivitamin, therapeutic (THERA-VIT) TABS tablet     venlafaxine (EFFEXOR) 75 MG tablet     ascorbic acid (VITAMIN C) 500 MG tablet     EPINEPHrine PF (ADRENALIN) 1 MG/ML injection     acetaminophen (TYLENOL) 32 mg/mL solution     nitroGLYcerin (NITROSTAT) 0.4 MG sublingual tablet     order for DME     doxycycline (VIBRAMYCIN) 100 MG capsule     QUEtiapine (SEROQUEL) 100 MG tablet     Silver Nitrate 10 % OINT     No current facility-administered medications for this visit.      Medications are managed by:  SELF, FAMILY, HOME NURSE  Patient uses a pill box to manage their medications:  Yes, care staff provides medications  Patient has questions, concerns, or potential side effects/interactions from their medications:  No  GERIATRIC ROS:    Do you have difficulty getting around, watching TV or reading because of poor eyesight? No   Can you hear normal conversational voice? No   Do you use hearing aides? No   Do you have problems with your memory? No   Have you unintentionally lost weight in the last 6 months? No   Do you have trouble with control of your bladder? Yes   Do you have trouble with control of your bowels? No   Have you had any falls in the past year? NA    GENERAL ROS:   General: No unexplained weight gain or loss; adequate sleep pattern.  Resp: No worsening shortness of breath, cough or hemoptysis.   GI: No worsening constipation, no diarrhea, no nausea or vomiting  : urine \"bypassing catheter\"  Skin: No new areas of concern and improved skin around tracheostomy site as well as maintenance of other wounds without worsening.  Extremities:  No pain, extremity " weakness or balance troubles    Objective: There were no vitals taken for this visit.     Gen: Laying in bed with tracheostomy in place, patient in no acute   HEENT: Head is atraumatic, decent dentition  CV: distress distant heart sounds, RRR - no murmurs, rubs, or gallups,   Pulm: Inspiratory rhonchi occasionally and appearing to be referred from trach.  Overall good air entry   ABD: Obese, soft, nontender, BS intact  Extrem: No edema  Skin: Patient with wound on anterior aspect of right side of chest/abdomen covered in Tegaderm.  No obvious signs of erythema surrounding trach.  Psych: Euthymic  Neuro: Pt is bedbound, responds appropriately to questions.    Preventative Screening:   Vaccinations reviewed and patient appears to be due for tetanus, pneumococcal vaccinations and influenza  Get up and Go test:  Not Applicable  Additional Recommended Screening: None    POA: Unknown Phone number: unknown  Code status: nursing staff were unable to find this information and are in the process of tracking down the documentation     Assessment/ Plan:  There are no diagnoses linked to this encounter.    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  Patient overall is doing well however is in need of more frequent tracheostomy changes due to frequency of being clogged.  Will increase to tracheostomy tubing changes every 2 weeks.  Patient also with history of suprapubic cath which has been noted to have sediment.  As result of this patient likely has some overflow incontinence around the Narayanan catheter tubing secondary to this being clogged.  We will also increase suprapubic catheter changes to every 2 weeks.  Due to course sounding lungs and physical exam as well as the clogged  tracheostomy tubing did discuss with patient that he may benefit from attempting the thickened liquids as previously noted.  Patient again reports he has been drinking regular liquids without any  thickening agent.  He reports he will consider this in the future.  We did review patient's medications and he does not appear to be on any stronger anticholinergic medications which could be leading to worsening of thick secretions in his tracheostomy tubing.  Also we will increase patient's order to 4 L of O2 to maintain oxygen saturation greater than 90%.  Lastly in terms of patient's anxiety and what nursing staff reports as shortness of breath versus anxious episodes during movement and turning for cleaning and wound care, we will try Benadryl.      Additionally, wounds are being cared for by wound care. No acute concerns.     RECOMMENDED FOLLOW UP:  2 months.    Level of Service:  82985    Total of 30 minutes was spent in face to face contact with patient with > 50% in counseling and coordination of care.  Options for treatment and/or follow-up care were reviewed with the patient. Vlad Gleason was engaged and actively involved in the decision making process. He verbalized understanding of the options discussed and was satisfied with the final plan.    Patient was seen and discussed with Dr. Mack.     Ariana Lynne  PGY2

## 2018-02-23 ENCOUNTER — TELEPHONE (OUTPATIENT)
Dept: FAMILY MEDICINE | Facility: CLINIC | Age: 68
End: 2018-02-23

## 2018-02-23 NOTE — TELEPHONE ENCOUNTER
[facility] Family Medicine phone call message- general phone call:    Reason for call: If Dr. Mack can sign the fax order for a large body sling.    Return call needed: Yes    OK to leave a message on voice mail? Yes    Primary language: English      needed? No    Call taken on February 23, 2018 at 12:23 PM by Grisel Flores-Cardona

## 2018-04-04 ENCOUNTER — MEDICAL CORRESPONDENCE (OUTPATIENT)
Dept: HEALTH INFORMATION MANAGEMENT | Facility: CLINIC | Age: 68
End: 2018-04-04

## 2018-04-05 ENCOUNTER — MEDICAL CORRESPONDENCE (OUTPATIENT)
Dept: HEALTH INFORMATION MANAGEMENT | Facility: CLINIC | Age: 68
End: 2018-04-05

## 2018-04-05 ENCOUNTER — TELEPHONE (OUTPATIENT)
Dept: FAMILY MEDICINE | Facility: CLINIC | Age: 68
End: 2018-04-05

## 2018-04-05 DIAGNOSIS — J95.02 INFECTION OF TRACHEOSTOMY STOMA (H): Primary | ICD-10-CM

## 2018-04-05 RX ORDER — DOXYCYCLINE 100 MG/1
100 CAPSULE ORAL 2 TIMES DAILY
Qty: 20 CAPSULE | Refills: 0 | Status: SHIPPED | OUTPATIENT
Start: 2018-04-05 | End: 2018-04-20

## 2018-04-05 NOTE — TELEPHONE ENCOUNTER
Orders called to Liam. He states they need an order faxed to [phone] with the medication the dose and how long the patient should be on it with a doctors signature.

## 2018-04-05 NOTE — TELEPHONE ENCOUNTER
[facility] Family Medicine phone call message- general phone call:    Reason for call: Stating patient is having trach area side pain and it is sore. Also is having foul order and drainage.    Return call needed: Yes    OK to leave a message on voice mail? Yes    Primary language: English      needed? No    Call taken on April 5, 2018 at 9:14 AM by Grisel Flores-Cardona

## 2018-04-05 NOTE — TELEPHONE ENCOUNTER
Nurse Misrak in regards to patient. States that the patient has had c/o around his stoma for about 3 days now. Today she noticed some redness around the trach site and discharge with a fowl odor. She questions what the doctor would like to do. Denies of fever.  Please advise. /MONICA Talavera  Routed to Dr. Mack

## 2018-04-16 ENCOUNTER — TELEPHONE (OUTPATIENT)
Dept: FAMILY MEDICINE | Facility: CLINIC | Age: 68
End: 2018-04-16

## 2018-04-16 NOTE — TELEPHONE ENCOUNTER
[facility] Family Medicine phone call message- general phone call:    Reason for call: Calling regarding questions on his dosage on medications.    Return call needed: Yes    OK to leave a message on voice mail? Yes    Primary language: English      needed? No    Call taken on April 16, 2018 at 9:25 AM by Jocy Borges

## 2018-04-16 NOTE — TELEPHONE ENCOUNTER
Pt completed 10 days of doxy but his trach site still painful and there's still inflammation.    Could there be a rx sent to the pharmacy for his thick secretions? The RN would like Rx:   3% hypertonic saline nebulizer    No new symptoms.     Please send to [facility].    RN would like a call back regarding these orders. Routed to Dr. Mack.  /MONICA Salazar

## 2018-04-17 ENCOUNTER — DOCUMENTATION ONLY (OUTPATIENT)
Dept: FAMILY MEDICINE | Facility: CLINIC | Age: 68
End: 2018-04-17

## 2018-04-17 ENCOUNTER — TRANSFERRED RECORDS (OUTPATIENT)
Dept: HEALTH INFORMATION MANAGEMENT | Facility: CLINIC | Age: 68
End: 2018-04-17

## 2018-04-17 DIAGNOSIS — F51.01 PRIMARY INSOMNIA: Primary | ICD-10-CM

## 2018-04-20 ENCOUNTER — TELEPHONE (OUTPATIENT)
Dept: FAMILY MEDICINE | Facility: CLINIC | Age: 68
End: 2018-04-20

## 2018-04-20 DIAGNOSIS — L03.221 CELLULITIS OF NECK: Primary | ICD-10-CM

## 2018-04-20 RX ORDER — TRAZODONE HYDROCHLORIDE 50 MG/1
50 TABLET, FILM COATED ORAL
Qty: 30 TABLET | Refills: 0
Start: 2018-04-20 | End: 2019-06-25

## 2018-04-20 RX ORDER — CEPHALEXIN 500 MG/1
500 CAPSULE ORAL 3 TIMES DAILY
Qty: 42 CAPSULE | Refills: 0 | Status: SHIPPED | OUTPATIENT
Start: 2018-04-20 | End: 2018-10-15

## 2018-04-20 NOTE — TELEPHONE ENCOUNTER
[facility] Family Medicine phone call message- general phone call:    Reason for call: received faxed order but would like some things clarified.     Return call needed: Yes    OK to leave a message on voice mail? Yes    Primary language: English      needed? No    Call taken on April 20, 2018 at 2:52 PM by Anaid Bautista

## 2018-04-20 NOTE — PROGRESS NOTES
Preceptor Attestation:   Patient seen, evaluated and discussed with the resident. I have verified the content of the note, which accurately reflects my assessment of the patient and the plan of care.   Supervising Physician:  Jonathan Mack MD

## 2018-04-20 NOTE — TELEPHONE ENCOUNTER
[facility] Family Medicine phone call message- general phone call:    Reason for call: please give a call back.    Return call needed: Yes    OK to leave a message on voice mail? Yes    Primary language: English      needed? No    Call taken on April 20, 2018 at 12:19 PM by Latricia Mercado

## 2018-04-23 ENCOUNTER — MEDICAL CORRESPONDENCE (OUTPATIENT)
Dept: HEALTH INFORMATION MANAGEMENT | Facility: CLINIC | Age: 68
End: 2018-04-23

## 2018-04-27 ENCOUNTER — TRANSFERRED RECORDS (OUTPATIENT)
Dept: HEALTH INFORMATION MANAGEMENT | Facility: CLINIC | Age: 68
End: 2018-04-27

## 2018-05-01 ENCOUNTER — MEDICAL CORRESPONDENCE (OUTPATIENT)
Dept: HEALTH INFORMATION MANAGEMENT | Facility: CLINIC | Age: 68
End: 2018-05-01

## 2018-05-02 ENCOUNTER — TELEPHONE (OUTPATIENT)
Dept: FAMILY MEDICINE | Facility: CLINIC | Age: 68
End: 2018-05-02

## 2018-05-02 NOTE — TELEPHONE ENCOUNTER
Ailin nurse at [facility] calling with regards to a possible new infection. She states that the patient has been taking Keflex for his trach infection and has been on it for 14 days. His last dose is tomorrow and there is improvement. However the RT has noticed that the patient is requiring more and more oxygen to support his sats with some mid section edema. Ailin and RT thinks that something else going on and would like an order placed for an xray if possible. She is aware the Dr. Mack is not in clinic today but will be in clinic tomorrow and Ailin states this is ok to address at that time. /MONICA Talavera      Routed to Dr. Mack

## 2018-05-02 NOTE — TELEPHONE ENCOUNTER
[facility] Family Medicine phone call message- general phone call:    Reason for call: Pt is having a infection in his trach. They have given him antibiotics but he is unable to maintain it.They are wondering if he should get a chest xray.  It would have to be mobile due to him being bed bound.    Return call needed: Yes    OK to leave a message on voice mail? Yes    Primary language: English      needed? No    Call taken on May 2, 2018 at 2:57 PM by Jocy Borges

## 2018-05-03 ENCOUNTER — TRANSFERRED RECORDS (OUTPATIENT)
Dept: HEALTH INFORMATION MANAGEMENT | Facility: CLINIC | Age: 68
End: 2018-05-03

## 2018-05-03 NOTE — TELEPHONE ENCOUNTER
Please give verbal order to obtain portable Chest Xray for Vlad Vasquezpreet.   Thanks,   Jonathan Mack MD

## 2018-05-08 ENCOUNTER — MEDICAL CORRESPONDENCE (OUTPATIENT)
Dept: HEALTH INFORMATION MANAGEMENT | Facility: CLINIC | Age: 68
End: 2018-05-08

## 2018-05-10 ENCOUNTER — TELEPHONE (OUTPATIENT)
Dept: FAMILY MEDICINE | Facility: CLINIC | Age: 68
End: 2018-05-10

## 2018-05-10 DIAGNOSIS — L03.221 CELLULITIS OF NECK: Primary | ICD-10-CM

## 2018-05-10 RX ORDER — DOXYCYCLINE 100 MG/1
100 CAPSULE ORAL 2 TIMES DAILY
Qty: 20 CAPSULE | Refills: 0 | Status: SHIPPED | OUTPATIENT
Start: 2018-05-10 | End: 2018-06-15

## 2018-05-10 NOTE — TELEPHONE ENCOUNTER
[facility] Family Medicine phone call message- general phone call:    Reason for call: the Xray results were sent to  but they have not heard anything back they want to talk to  about the results and what steps need to be taken next.         Return call needed: Yes    OK to leave a message on voice mail? Yes    Primary language: English      needed? No    Call taken on May 10, 2018 at 9:01 AM by Latricia Mercado

## 2018-05-10 NOTE — LETTER
"[address] [address]  Phone: [phone]  Fax: [phone]    May 10, 2018        Vlad Gleason  [address] [address]        1) Use supplemental oxygen to keep O2 sats between 92%-94%.    Vlad is likely \"retaining carbon dioxide\" and too much supplemental oxygen may lead to markedly high carbon dioxide resulting in Vlad being drowsy.    2) Doxycycline 100mg BID for 10 days (faxed to [facility]). Dx neck cellulitis            Jonathan Mack MD  "

## 2018-05-10 NOTE — TELEPHONE ENCOUNTER
"Phone call made to [facility], and I spoke to Vlad's nurse. I faxed order to his [facility] home.  (see \"Letter\")    Jonathan Mack MD  "

## 2018-05-22 ENCOUNTER — MEDICAL CORRESPONDENCE (OUTPATIENT)
Dept: HEALTH INFORMATION MANAGEMENT | Facility: CLINIC | Age: 68
End: 2018-05-22

## 2018-05-30 ENCOUNTER — TRANSFERRED RECORDS (OUTPATIENT)
Dept: HEALTH INFORMATION MANAGEMENT | Facility: CLINIC | Age: 68
End: 2018-05-30

## 2018-05-30 ENCOUNTER — TELEPHONE (OUTPATIENT)
Dept: FAMILY MEDICINE | Facility: CLINIC | Age: 68
End: 2018-05-30

## 2018-05-30 DIAGNOSIS — S31.000S WOUND OF SACRAL REGION, SEQUELA: ICD-10-CM

## 2018-05-30 NOTE — TELEPHONE ENCOUNTER
[facility] Family Medicine phone call message- patient requesting a refill:    Full Medication Name:   oxyCODONE IR (ROXICODONE) 5 MG tablet 30 tablet 0       Dose: Sig - Route: Take 1 tablet (5 mg) by mouth daily as needed for pain or other (Prior to dressing change IF in severe pain.  Please doument pain level prior ro Rx administartion and one hour after.) Use only for severe pain - Oral       Additional Comments: Please send hard copy to [address] [place] [address].    OK to leave a message on voice mail? Yes    Primary language: English      needed? No    Call taken on May 30, 2018 at 2:28 PM by Jocy Borges

## 2018-05-31 RX ORDER — OXYCODONE HYDROCHLORIDE 5 MG/1
5 TABLET ORAL DAILY PRN
Qty: 30 TABLET | Refills: 0 | Status: SHIPPED | OUTPATIENT
Start: 2018-05-31 | End: 2018-07-03

## 2018-06-07 ENCOUNTER — MEDICAL CORRESPONDENCE (OUTPATIENT)
Dept: HEALTH INFORMATION MANAGEMENT | Facility: CLINIC | Age: 68
End: 2018-06-07

## 2018-06-10 ENCOUNTER — MEDICAL CORRESPONDENCE (OUTPATIENT)
Dept: HEALTH INFORMATION MANAGEMENT | Facility: CLINIC | Age: 68
End: 2018-06-10

## 2018-06-10 ENCOUNTER — TELEPHONE (OUTPATIENT)
Dept: FAMILY MEDICINE | Facility: CLINIC | Age: 68
End: 2018-06-10

## 2018-06-12 ENCOUNTER — MEDICAL CORRESPONDENCE (OUTPATIENT)
Dept: HEALTH INFORMATION MANAGEMENT | Facility: CLINIC | Age: 68
End: 2018-06-12

## 2018-06-13 ENCOUNTER — TELEPHONE (OUTPATIENT)
Dept: FAMILY MEDICINE | Facility: CLINIC | Age: 68
End: 2018-06-13

## 2018-06-13 DIAGNOSIS — T83.090A OBSTRUCTION OF SUPRAPUBIC CATHETER, INITIAL ENCOUNTER (H): Primary | ICD-10-CM

## 2018-06-13 NOTE — TELEPHONE ENCOUNTER
Answering Service Encounter:    Called re: suprapubic catheter plugged by Hugo - home health nurse    Suprapubic catheter - 20 [address]. Plugged. Has already been replaced this month. Has been trying irrigation and multiple methods of flushing with no improvement. He has a lot of sediment. He gets it replaced at his current living situation without difficulty. Home health nurse has already called [facility].     [facility] - [phone].     Discussed with Flynn from [facility] who will ensure patient gets replacement catheter tonight. No further orders needed. Discussed with Hugo who will let me know if any barriers arise.     Alley Nick MD, PGY-2  [facility] Medicine Residency

## 2018-06-15 ENCOUNTER — TELEPHONE (OUTPATIENT)
Dept: FAMILY MEDICINE | Facility: CLINIC | Age: 68
End: 2018-06-15

## 2018-06-15 DIAGNOSIS — L03.221 CELLULITIS OF NECK: ICD-10-CM

## 2018-06-15 RX ORDER — DOXYCYCLINE 100 MG/1
100 CAPSULE ORAL 2 TIMES DAILY
Qty: 14 CAPSULE | Refills: 0 | Status: SHIPPED | OUTPATIENT
Start: 2018-06-15 | End: 2019-01-10

## 2018-06-15 NOTE — TELEPHONE ENCOUNTER
Please call  1.  Ok for ua/uc  2.  Ok to change catheter q week.  3.  rx for doxycycline 100 mg po bid for 7 days given for possible skin/lung infection.  This can also treat uti's.    C Nitishert

## 2018-06-15 NOTE — TELEPHONE ENCOUNTER
[facility] Family Medicine phone call message - order or referral request for patient:     Order or referral being requested: uauc and other orders        Additional Comments: the home care provider called to ask if the nurse could call her to talk about the orders she needs. And Pt is still complaining about pain at his stom site     OK to leave a message on voice mail? Yes    Primary language: English      needed? No    Call taken on Marlee 15, 2018 at 12:24 PM by Pierre Dukes

## 2018-06-15 NOTE — TELEPHONE ENCOUNTER
Ailin patient's nurse is calling with regards to his suprapubic catheter that has been clogging up due to increased sediment. Current orders is to change it every two but they are requesting orders to change it every week as needed. She  states that the catheter has been clogging beyond irrigation. Ailin is also requesting orders for a UA UC to see if an infection is causing the increased amounts of sediment. Also states that the patient has an ongoing problem with trach soreness and saturation. She states that they 4x4 gauze around the trach is saturated with yellow drainage. The patient has been on antibiotics for this on and off. Please advise. /MONICA Talavera      Routed to Dr. Velez proxy for Dr. Thompson

## 2018-06-18 NOTE — TELEPHONE ENCOUNTER
Nurse spoke with Jazmin davies and she ask that orders be faxed to [phone]. Orders faxed. /MONICA Talavera

## 2018-06-19 ENCOUNTER — MEDICAL CORRESPONDENCE (OUTPATIENT)
Dept: HEALTH INFORMATION MANAGEMENT | Facility: CLINIC | Age: 68
End: 2018-06-19

## 2018-06-25 ENCOUNTER — DOCUMENTATION ONLY (OUTPATIENT)
Dept: FAMILY MEDICINE | Facility: CLINIC | Age: 68
End: 2018-06-25

## 2018-06-25 DIAGNOSIS — J96.11 CHRONIC RESPIRATORY FAILURE WITH HYPOXIA AND HYPERCAPNIA (H): Primary | ICD-10-CM

## 2018-06-25 DIAGNOSIS — J96.12 CHRONIC RESPIRATORY FAILURE WITH HYPOXIA AND HYPERCAPNIA (H): Primary | ICD-10-CM

## 2018-06-25 DIAGNOSIS — F33.3 SEVERE EPISODE OF RECURRENT MAJOR DEPRESSIVE DISORDER, WITH PSYCHOTIC FEATURES (H): ICD-10-CM

## 2018-06-25 DIAGNOSIS — F51.01 PRIMARY INSOMNIA: ICD-10-CM

## 2018-06-25 DIAGNOSIS — R33.9 URINARY RETENTION: ICD-10-CM

## 2018-06-25 NOTE — Clinical Note
Bella Grace, the nursing home asked that I write them this letter for insurance reimbursment purposes, can you please send them this letter? Thanks

## 2018-06-25 NOTE — LETTER
Re: Vlad Gleason  : [birth year]    To Whom It May Concern:    Mr. Gleason is a patient of [facility] who we have been following in our home visits. It has been noticed that he is requiring increased replacement of his Catheter. Please increase the frequency and replacement of his catheter as this is a medical necessity for him. If you have any questions, please contact [facility] at [phone]      Sincerely,    Daniel Newton  Family Medicine Resident PGY3

## 2018-06-26 ENCOUNTER — TELEPHONE (OUTPATIENT)
Dept: FAMILY MEDICINE | Facility: CLINIC | Age: 68
End: 2018-06-26

## 2018-06-26 VITALS — TEMPERATURE: 97.7 F | RESPIRATION RATE: 20 BRPM | HEART RATE: 70 BPM

## 2018-06-26 DIAGNOSIS — J96.11 CHRONIC RESPIRATORY FAILURE WITH HYPOXIA AND HYPERCAPNIA (H): Primary | ICD-10-CM

## 2018-06-26 DIAGNOSIS — J96.12 CHRONIC RESPIRATORY FAILURE WITH HYPOXIA AND HYPERCAPNIA (H): Primary | ICD-10-CM

## 2018-06-26 PROBLEM — F51.01 PRIMARY INSOMNIA: Status: ACTIVE | Noted: 2018-06-26

## 2018-06-26 NOTE — PROGRESS NOTES
Subjective: Vlad Gleason is a 67 year old who is seen at home for follow up visit today.  Seen at [address] [place] [address] .    Also present at visit include Home nurse  Acute concerns today include:   - Trouble breathing. Would like oxygen rate increased. Has been using breathing treatment but feels having a few moments of increased rate would be helpful  -decreased sleep. Has been started on trazadone after being switched from melatonin  -Feels like he has poor food intake. Im further discussion with the nurses, he has been having a good amount of food especially with lots of cheese  -Home nurse request letter stating patient needs new cathater weekly due to frequent clogging  - Loose teeth, would like a medication to make teeth better. As per nursing staff, patient has been refusing dental care like brushing teeth  -Med Rec: pt. Tylenol now scheduled to 650qID,, Gabapentin 300mg TID, Trazadone increased to 100mg HS, and Duoneb TID with d/c of albuterol      Chronic and Past Medical Problems include:  Patient Active Problem List   Diagnosis     Chronic respiratory failure with hypoxia and hypercapnia (H)     Obesity hypoventilation syndrome (H)     Dysphagia     Urinary retention     Sacral wound     Major depressive disorder with psychotic features (H)     Paroxysmal atrial fibrillation (H)     Surgical wound, non healing       Social History:   Current activities:  Remains at group home and is bedbound  Support services:  24 hr care at group home  PMHX/PSHX/MEDS/ALLERGIES/SHX/FHX reviewed and updated in Epic.   MEDICATION LIST:  Current Outpatient Prescriptions   Medication     acetaminophen (TYLENOL) 32 mg/mL solution     albuterol (2.5 MG/3ML) 0.083% neb solution     ascorbic acid (VITAMIN C) 500 MG tablet     aspirin 81 MG chewable tablet     brimonidine (ALPHAGAN) 0.2 % ophthalmic solution     budesonide (PULMICORT) 0.5 MG/2ML neb solution     carvedilol (COREG) 3.125 MG tablet     cephALEXin  (KEFLEX) 500 MG capsule     diphenhydrAMINE (BENADRYL) 25 MG tablet     Docusate Sodium (DIOCTO) 150 MG/15ML LIQD     doxycycline (VIBRAMYCIN) 100 MG capsule     EPINEPHrine PF (ADRENALIN) 1 MG/ML injection     ferrous sulfate 220 (44 FE) MG/5ML ELIX     fluticasone (FLONASE) 50 MCG/ACT spray     loratadine (CLARITIN) 10 MG tablet     melatonin 3 MG tablet     multivitamin, therapeutic (THERA-VIT) TABS tablet     nitroGLYcerin (NITROSTAT) 0.4 MG sublingual tablet     olopatadine (PATANOL) 0.1 % ophthalmic solution     omeprazole (PRILOSEC) 2 mg/mL SUSP     order for DME     order for DME     order for DME     order for DME     oxyCODONE IR (ROXICODONE) 5 MG tablet     polyethylene glycol 3350 POWD     QUEtiapine (SEROQUEL) 100 MG tablet     Silver Nitrate 10 % OINT     traZODone (DESYREL) 50 MG tablet     venlafaxine (EFFEXOR) 75 MG tablet     No current facility-administered medications for this visit.      Medications are managed by: HOME NURSE  Patient uses a pill box to manage their medications:  Home RN  Patient has questions, concerns, or potential side effects/interactions from their medications:   No  GERIATRIC ROS:    Do you have difficulty getting around, watching TV or reading because of poor eyesight? No   Can you hear normal conversational voice? Yes    Do you use hearing aides? No   Do you have problems with your memory? No   Do you have trouble with control of your bladder? Yes Do you have trouble with control of your bowels? Yes      GENERAL ROS:   General: No unexplained weight gain or loss;Feels he is sleeping less  Resp: Feels SOB and pain on trach area  GI: No worsening constipation, no diarrhea, no nausea or vomiting  : Suprapubic Catheter in place  Extremities:  No pain, extremity weakness or balance troubles    Objective: Pulse 70  Temp 97.7  F (36.5  C)  Resp 20   Gen: Well nourished, severely obese and in NAD   HEENT: Head is atraumatic, poor dentation with loose front teeth  CV: RRR -  no murmurs, rubs, or gallups, - distant heart sound due to poor habits  Pulm:Scattered Ronchi throughout, trachea has dried crusting of yellow thin discharge but no erythema or swelling  ABD: soft, Obese  Psych: Euthymic    Preventative Screening:     Get up and Go test Not Applicable.       Code status: Full  Healthcare Directive, Living Will, POLST has been completed:  Nursing staff has on file     Assessment/ Plan:  1. Chronic respiratory failure with hypoxia and hypercapnia (H)    2. Severe episode of recurrent major depressive disorder, with psychotic features (H)  Patient is chronically trach and vented. Increased SOB likely multifactorial causes. Lung sounds at baseline and unlikely to be infection. Trachea has some mucus but no erythema or swelling to suggest infection. Will switch from albuterol to duo-neb TID. WIll also increase gabapentin to TID as may help with anxiety. Trazadone will also be increased to 100mg qHS as will help with sleep as well as anxiety that is contributing to SOB. Will continues ucomist  - Reassess in 1month to ensure improvement    3. Primary insomnia  - Can be from anxiety and depression  - Increase Trazadone to 100mg nightly    4. Urinary retention  Letter written to be mailed out to Home Health nurse to give to insurance allowing new cath weekly     Patient also does not appear malnurished and as per nurse reports, has been eating well. WIll need to trend weights to ensure stable or only controlled weight loss. Recheck at next vist    RECOMMENDED FOLLOW UP:  2 months.    Level of Service:  46420    Total of 30 minutes was spent in face to face contact with patient with > 50% in counseling and coordination of care.  Options for treatment and/or follow-up care were reviewed with the patient. Vlad Gleason was engaged and actively involved in the decision making process. He verbalized understanding of the options discussed and was satisfied with the final plan.      Daniel  Aman  Family Medicine Resident PGY3

## 2018-06-28 ENCOUNTER — TELEPHONE (OUTPATIENT)
Dept: FAMILY MEDICINE | Facility: CLINIC | Age: 68
End: 2018-06-28

## 2018-06-28 NOTE — TELEPHONE ENCOUNTER
[facility] Family Medicine phone call message- general phone call:    Reason for call: Waiting on orders from Dr Mack for patients for CO2 levels to be checked.    Return call needed: Yes    OK to leave a message on voice mail? Yes    Primary language: English      needed? No    Call taken on June 28, 2018 at 1:31 PM by Jocy Borges

## 2018-06-28 NOTE — TELEPHONE ENCOUNTER
Order refaxed. Jazmin (nurse) advised to call the clinic if order is not received.  /MONICA Talavera

## 2018-06-29 ENCOUNTER — TELEPHONE (OUTPATIENT)
Dept: FAMILY MEDICINE | Facility: CLINIC | Age: 68
End: 2018-06-29

## 2018-06-29 ENCOUNTER — MEDICAL CORRESPONDENCE (OUTPATIENT)
Dept: HEALTH INFORMATION MANAGEMENT | Facility: CLINIC | Age: 68
End: 2018-06-29

## 2018-06-29 NOTE — TELEPHONE ENCOUNTER
[facility] Family Medicine phone call message- general phone call:    Reason for call: Calling requesting verbal orders for vent settings. Co2 was high and so there were some adjustments done, but cannot keep them unless doctor signs off. There is about 6 different vent settings that need to be done. If can call back today, by the end of day would be great.     Return call needed: Yes    OK to leave a message on voice mail? Yes    Primary language: English      needed? No    Call taken on June 29, 2018 at 10:26 AM by Grisel Flores-Cardona

## 2018-06-29 NOTE — TELEPHONE ENCOUNTER
[facility] Family Medicine phone call message- general phone call:    Reason for call: [facility] returning call     Return call needed: Yes    OK to leave a message on voice mail? Yes    Primary language: English      needed? No    Call taken on June 29, 2018 at 1:03 PM by Anaid Bautista

## 2018-07-03 DIAGNOSIS — S31.000S WOUND OF SACRAL REGION, SEQUELA: ICD-10-CM

## 2018-07-03 RX ORDER — OXYCODONE HYDROCHLORIDE 5 MG/1
5 TABLET ORAL DAILY PRN
Qty: 30 TABLET | Refills: 0 | Status: SHIPPED | OUTPATIENT
Start: 2018-07-03 | End: 2018-11-26

## 2018-07-06 NOTE — TELEPHONE ENCOUNTER
[facility]: [facility] call back number   She is saying they never received this order back she needs this done. Please give a call back asap

## 2018-07-12 ENCOUNTER — TELEPHONE (OUTPATIENT)
Dept: FAMILY MEDICINE | Facility: CLINIC | Age: 68
End: 2018-07-12

## 2018-07-12 NOTE — TELEPHONE ENCOUNTER
[facility] Family Medicine phone call message- general phone call:    Reason for call: the home care nurse called to ask to speak to the Dr. The home care provider does not do abt  In the home any more so they need to talk to the Dr to ask what to do next      Return call needed: Yes    OK to leave a message on voice mail? Yes    Primary language: English      needed? No    Call taken on July 12, 2018 at 3:29 PM by Pierre Dukes

## 2018-07-14 ENCOUNTER — TRANSFERRED RECORDS (OUTPATIENT)
Dept: HEALTH INFORMATION MANAGEMENT | Facility: CLINIC | Age: 68
End: 2018-07-14

## 2018-07-16 ENCOUNTER — TELEPHONE (OUTPATIENT)
Dept: FAMILY MEDICINE | Facility: CLINIC | Age: 68
End: 2018-07-16

## 2018-07-16 DIAGNOSIS — T83.090A OBSTRUCTION OF SUPRAPUBIC CATHETER, INITIAL ENCOUNTER (H): Primary | ICD-10-CM

## 2018-07-16 DIAGNOSIS — N32.81 OVERACTIVE BLADDER: ICD-10-CM

## 2018-07-16 NOTE — TELEPHONE ENCOUNTER
Patient is having frequent urinating bypassing of the catheter, it is irrigated every two weeks but it is still saturated.  Nurse would like to try ditropan medication for the patient. Please advise.

## 2018-07-17 ENCOUNTER — TRANSFERRED RECORDS (OUTPATIENT)
Dept: HEALTH INFORMATION MANAGEMENT | Facility: CLINIC | Age: 68
End: 2018-07-17

## 2018-07-18 RX ORDER — OXYBUTYNIN CHLORIDE 10 MG/1
10 TABLET, EXTENDED RELEASE ORAL DAILY
Qty: 30 TABLET | Refills: 1 | Status: SHIPPED | OUTPATIENT
Start: 2018-07-18

## 2018-07-20 ENCOUNTER — MEDICAL CORRESPONDENCE (OUTPATIENT)
Dept: HEALTH INFORMATION MANAGEMENT | Facility: CLINIC | Age: 68
End: 2018-07-20

## 2018-08-01 ENCOUNTER — TRANSFERRED RECORDS (OUTPATIENT)
Dept: HEALTH INFORMATION MANAGEMENT | Facility: CLINIC | Age: 68
End: 2018-08-01

## 2018-08-10 ENCOUNTER — MEDICAL CORRESPONDENCE (OUTPATIENT)
Dept: HEALTH INFORMATION MANAGEMENT | Facility: CLINIC | Age: 68
End: 2018-08-10

## 2018-08-15 ENCOUNTER — MEDICAL CORRESPONDENCE (OUTPATIENT)
Dept: HEALTH INFORMATION MANAGEMENT | Facility: CLINIC | Age: 68
End: 2018-08-15

## 2018-08-16 ENCOUNTER — TRANSFERRED RECORDS (OUTPATIENT)
Dept: HEALTH INFORMATION MANAGEMENT | Facility: CLINIC | Age: 68
End: 2018-08-16

## 2018-08-16 ENCOUNTER — MEDICAL CORRESPONDENCE (OUTPATIENT)
Dept: HEALTH INFORMATION MANAGEMENT | Facility: CLINIC | Age: 68
End: 2018-08-16

## 2018-08-17 ENCOUNTER — MEDICAL CORRESPONDENCE (OUTPATIENT)
Dept: HEALTH INFORMATION MANAGEMENT | Facility: CLINIC | Age: 68
End: 2018-08-17

## 2018-08-20 ENCOUNTER — MEDICAL CORRESPONDENCE (OUTPATIENT)
Dept: HEALTH INFORMATION MANAGEMENT | Facility: CLINIC | Age: 68
End: 2018-08-20

## 2018-08-20 ENCOUNTER — DOCUMENTATION ONLY (OUTPATIENT)
Dept: FAMILY MEDICINE | Facility: CLINIC | Age: 68
End: 2018-08-20

## 2018-08-20 DIAGNOSIS — J96.11 CHRONIC RESPIRATORY FAILURE WITH HYPOXIA AND HYPERCAPNIA (H): ICD-10-CM

## 2018-08-20 DIAGNOSIS — T81.89XS NON-HEALING SURGICAL WOUND, SEQUELA: ICD-10-CM

## 2018-08-20 DIAGNOSIS — I48.0 PAROXYSMAL ATRIAL FIBRILLATION (H): ICD-10-CM

## 2018-08-20 DIAGNOSIS — F51.01 PRIMARY INSOMNIA: ICD-10-CM

## 2018-08-20 DIAGNOSIS — R33.9 URINARY RETENTION: ICD-10-CM

## 2018-08-20 DIAGNOSIS — J96.12 CHRONIC RESPIRATORY FAILURE WITH HYPOXIA AND HYPERCAPNIA (H): ICD-10-CM

## 2018-08-20 DIAGNOSIS — S31.000S WOUND OF SACRAL REGION, SEQUELA: ICD-10-CM

## 2018-08-20 DIAGNOSIS — E66.2 OBESITY HYPOVENTILATION SYNDROME (H): Primary | ICD-10-CM

## 2018-08-21 NOTE — PROGRESS NOTES
"Subjective: Vlad Gleason is a 67 year old who is seen at home for follow up visit today.  Seen at [address] [place] [address] .    Also present at visit include home nurse.  Phone: [phone]  Acute concerns today include:   \"Chronic wound on abdomen at surgical site\" per nurse, need restocking of wound care supplies so home nurse can continue wound care  \"Lower extremity and intertriginous wounds\" per nurse, need restocking of wound care supplies so home nurse can continue wound care of legs and folds of pannus  \"Pain around my trach\" -  Patient complains of aching pain underneath the band fixing his trach to his neck, particularly on the right side of the neck; also the site of the ostomy is sore and he has had some sanguinous secretions after needing an emergent trach replacement on the 18th. He required an emergent trach after pulling out his current trach (per nurse). Patient does not recall why he needed an emergent trach.   \"L shoulder soreness\" bothers him chronically, no recent change  \"Trouble sleeping\" - taking increased trazodone dose; no day time sleepiness; states that the medicine gets him to sleep but then he wakes up in the night and cannot go back to sleep.   \"Eating less\"  - subjective, per patient; intake is unchanged (per nurse).   Chronic and Past Medical Problems include:  Patient Active Problem List   Diagnosis     Chronic respiratory failure with hypoxia and hypercapnia (H)     Obesity hypoventilation syndrome (H)     Dysphagia     Urinary retention     Sacral wound     Major depressive disorder with psychotic features (H)     Paroxysmal atrial fibrillation (H)     Surgical wound, non healing     Primary insomnia     Social History:   Current activities:  bedbound  Support services:  Home nursing; no visitors (per patient and nurse)  PMHX/PSHX/MEDS/ALLERGIES/SHX/FHX reviewed and updated in Epic.   MEDICATION LIST:  Current Outpatient Prescriptions   Medication     acetaminophen " (TYLENOL) 32 mg/mL solution     albuterol (2.5 MG/3ML) 0.083% neb solution     ascorbic acid (VITAMIN C) 500 MG tablet     aspirin 81 MG chewable tablet     brimonidine (ALPHAGAN) 0.2 % ophthalmic solution     budesonide (PULMICORT) 0.5 MG/2ML neb solution     carvedilol (COREG) 3.125 MG tablet     cephALEXin (KEFLEX) 500 MG capsule     diphenhydrAMINE (BENADRYL) 25 MG tablet     Docusate Sodium (DIOCTO) 150 MG/15ML LIQD     doxycycline (VIBRAMYCIN) 100 MG capsule     EPINEPHrine PF (ADRENALIN) 1 MG/ML injection     ferrous sulfate 220 (44 FE) MG/5ML ELIX     fluticasone (FLONASE) 50 MCG/ACT spray     loratadine (CLARITIN) 10 MG tablet     melatonin 3 MG tablet     multivitamin, therapeutic (THERA-VIT) TABS tablet     nitroGLYcerin (NITROSTAT) 0.4 MG sublingual tablet     olopatadine (PATANOL) 0.1 % ophthalmic solution     omeprazole (PRILOSEC) 2 mg/mL SUSP     order for DME     order for DME     order for DME     order for DME     oxybutynin (DITROPAN XL) 10 MG 24 hr tablet     oxyCODONE IR (ROXICODONE) 5 MG tablet     polyethylene glycol 3350 POWD     QUEtiapine (SEROQUEL) 100 MG tablet     Silver Nitrate 10 % OINT     traZODone (DESYREL) 50 MG tablet     venlafaxine (EFFEXOR) 75 MG tablet     No current facility-administered medications for this visit.      Medications are managed by:  SELF, FAMILY, HOME NURSE  Patient uses a pill box to manage their medications:  Nurse management   Patient has questions, concerns, or potential side effects/interactions from their medications:  No  GERIATRIC ROS:    Do you have difficulty getting around, watching TV or reading because of poor eyesight? No   Can you hear normal conversational voice? Yes   Do you use hearing aides? No   Do you have problems with your memory? Yes, not entirely remembering habits or significant events in his care   Do you often feel sad or depressed? Yes / No   Have you unintentionally lost weight in the last 6 months? No   Do you have trouble with  control of your bladder? Yes, suprapubic catheter in place due to lower tract obstruction   Do you have trouble with control of your bowels? No   Have you had any falls in the past year? No   How many? n/a    GENERAL ROS:   General: No unexplained weight gain or loss; adequate sleep pattern.  Resp: No worsening shortness of breath, cough or hemoptysis.   GI: No worsening constipation, no diarrhea, no nausea or vomiting  : Voiding independently with no significant incontinence   Skin: No areas of new skin breakdown or worrisome rashes  Extremities:  No pain, extremity weakness or balance troubles    Objective: There were no vitals taken for this visit.    Gen: Well nourished and in NAD   HEENT: Head is atraumatic; trach tube in place held by standard neck strap; small guaze squares protect edges of stoma from trach tube [place], but some erythema and moisture is noticeable on right neck c/w neck strap rubbing underneath; skin intact  CV: RRR - no murmurs, rubs, or gallups,   Pulm: CTAB, no wheezes/rales/rhonchi, good air entry   ABD: soft, BS not audible; PEG tube in place without surrounding erythema or induration; suprapubic catheter in place without surrounding erythema or induration; clean and dated wound dressing in place on RUQ abdominal wound without surrounding erythema or induration, no serosanguinous weeping;   Skin: intertriginous moisture and maceration in folds of abdomen and flanks; moisture and maceration in right groin;   Extrem: no cyanosis, edema or clubbing; wet, wrinkled and friable tender circumferential erythema of bilateral lower legs with sparse small (~5mm) soft brown eschars and small (~5mm) slowly bleeding superficial wounds on anterior tibias and medial malleoli bilaterally; Posterior and medial right thigh with wet, friable and tender non-blanching moderate (~4cm x 5cm) patch of erythema and slowly bleeding Stage 2 wounds within a large (~6cm x 7cm) patch of blanching erythema and  ecchymosis;   Bilateral feet with elongated nails, otherwise skin intact, no erythema  Psych: Euthymic  : penis receded in pannus, scrotum tender to palpation without erythema, ecchymosis, or ulceration, urinary sediment visible at urethral meatus; skin wet in groin  Neuro: Pt is not able to ambulate independently   Preventative Screening:   Vaccinations reviewed and up to date: NO, needs PCV13, due for tetanus booster  Get up and Go test:  Not Applicable.    Additional Recommended Screening:     Code status: Full  Healthcare Directive, Living Will, POLST has been completed:  Adv. Yeni. 9/20/2017      Assessment/ Plan:  Chronic wound on abdomen at surgical site   Lower extremity and intertriginous wounds  Reordered 15x15 Mepilex, 10x10 Adhesive [place] Dressing, 4x8 Telfex, 8-in trach, 24-in suprapubic catheter, Hibiclens solution, Xeroform and Kerlix, for wet areas - recommended Nystatin powder; for dry areas - recommended A+D ointment  Pain around trach   Recommended immobilizing trach strap with non-irritating medical tape to prevent rubbing and pain and further padding trach [place] on superior and inferior aspects  Trouble sleeping   Reassured patient that without daytime sleepiness or interference of his activities, less than 8 hours of continuous sleep is not necessarily harmful; recommended occupying his mind with puzzles, reading, or writing if waking up and unable to return to sleep; reassess on future visits  Needs PCV13, due for tetanus booster.  Bring toenail kit to next visit; call ahead to soak 10 minutes in warm water.     RECOMMENDED FOLLOW UP:  2 months.    Level of Service:  19779    Total of 40 minutes was spent in face to face contact with patient with > 50% in counseling and coordination of care.  Options for treatment and/or follow-up care were reviewed with the patient. Vlad Gleason was engaged and actively involved in the decision making process. He verbalized understanding of the  options discussed and was satisfied with the final plan.    This patient was seen and discussed with supervising physician Dr. Jonathan Mack.    Liseth Sterling, DO

## 2018-08-22 ENCOUNTER — MEDICAL CORRESPONDENCE (OUTPATIENT)
Dept: HEALTH INFORMATION MANAGEMENT | Facility: CLINIC | Age: 68
End: 2018-08-22

## 2018-09-04 ENCOUNTER — MEDICAL CORRESPONDENCE (OUTPATIENT)
Dept: HEALTH INFORMATION MANAGEMENT | Facility: CLINIC | Age: 68
End: 2018-09-04

## 2018-09-10 ENCOUNTER — TELEPHONE (OUTPATIENT)
Dept: FAMILY MEDICINE | Facility: CLINIC | Age: 68
End: 2018-09-10

## 2018-09-10 DIAGNOSIS — J44.9 CHRONIC OBSTRUCTIVE PULMONARY DISEASE, UNSPECIFIED COPD TYPE (H): Primary | ICD-10-CM

## 2018-09-10 RX ORDER — ACETYLCYSTEINE 200 MG/ML
4 SOLUTION ORAL; RESPIRATORY (INHALATION) 2 TIMES DAILY
Qty: 60 VIAL | Refills: 11 | Status: SHIPPED | OUTPATIENT
Start: 2018-09-10 | End: 2018-10-15

## 2018-09-10 NOTE — TELEPHONE ENCOUNTER
[facility] phone call message- general phone call:    Reason for call: Prescription for Mucomyst (N-acetylcysteine) Requesting a 30 day supply with 12 refills with the diagnosis of COPD.  Please send to [facility].  I do not see this prescription on the med list.    Return call needed: Yes    OK to leave a message on voice mail? Yes    Primary language: English      needed? No    Call taken on September 10, 2018 at 10:46 AM by Jocy Borges

## 2018-09-10 NOTE — TELEPHONE ENCOUNTER
Rx sent to pharmacy. Vlad has been getting mucomyst twice a day as neb (along with DuoNeb) into trach. Unclear volume of mucomyst being given, but vial comes as 4ml.  E-prescription for 4ml vials (#60,11RF) sent to [facility].    MB

## 2018-09-11 ENCOUNTER — TRANSFERRED RECORDS (OUTPATIENT)
Dept: HEALTH INFORMATION MANAGEMENT | Facility: CLINIC | Age: 68
End: 2018-09-11

## 2018-09-14 ENCOUNTER — MEDICAL CORRESPONDENCE (OUTPATIENT)
Dept: HEALTH INFORMATION MANAGEMENT | Facility: CLINIC | Age: 68
End: 2018-09-14

## 2018-09-20 ENCOUNTER — TRANSFERRED RECORDS (OUTPATIENT)
Dept: HEALTH INFORMATION MANAGEMENT | Facility: CLINIC | Age: 68
End: 2018-09-20

## 2018-10-01 ENCOUNTER — MEDICAL CORRESPONDENCE (OUTPATIENT)
Dept: HEALTH INFORMATION MANAGEMENT | Facility: CLINIC | Age: 68
End: 2018-10-01

## 2018-10-05 ENCOUNTER — MEDICAL CORRESPONDENCE (OUTPATIENT)
Dept: HEALTH INFORMATION MANAGEMENT | Facility: CLINIC | Age: 68
End: 2018-10-05

## 2018-10-09 ENCOUNTER — MEDICAL CORRESPONDENCE (OUTPATIENT)
Dept: HEALTH INFORMATION MANAGEMENT | Facility: CLINIC | Age: 68
End: 2018-10-09

## 2018-10-15 ENCOUNTER — DOCUMENTATION ONLY (OUTPATIENT)
Dept: FAMILY MEDICINE | Facility: CLINIC | Age: 68
End: 2018-10-15

## 2018-10-15 DIAGNOSIS — L60.3 DYSTROPHIC NAIL: ICD-10-CM

## 2018-10-15 DIAGNOSIS — Z93.0 TRACHEOSTOMY IN PLACE (H): ICD-10-CM

## 2018-10-15 DIAGNOSIS — E66.2 OBESITY HYPOVENTILATION SYNDROME (H): Primary | ICD-10-CM

## 2018-10-15 RX ORDER — PRENATAL VIT 91/IRON/FOLIC/DHA 28-975-200
COMBINATION PACKAGE (EA) ORAL 2 TIMES DAILY
Qty: 12 G | Refills: 1 | COMMUNITY
Start: 2018-10-15 | End: 2018-12-18

## 2018-10-16 RX ORDER — IPRATROPIUM BROMIDE AND ALBUTEROL SULFATE 2.5; .5 MG/3ML; MG/3ML
1 SOLUTION RESPIRATORY (INHALATION) 2 TIMES DAILY
Qty: 60 VIAL | Refills: 11 | Status: SHIPPED | OUTPATIENT
Start: 2018-10-16

## 2018-10-16 NOTE — PROGRESS NOTES
Preceptor Attestation:   Patient seen, evaluated and discussed with the resident. I have verified the content of the note, which accurately reflects my assessment of the patient and the plan of care.   Supervising Physician:  Jonathan Mack MD     '

## 2018-10-16 NOTE — PROGRESS NOTES
Per [facility] - patient's history regarding his trach being placed was [facility] in October 2016.     [facility] prefer the patient follow up with the original Provider who placed the trach.    [facility] - Providers do not currently work with trach patients     Do you have a recommendation on which ENT providers might be open to a visit at [facility]? I will continue to reach out to additional contacts.

## 2018-10-16 NOTE — PROGRESS NOTES
Spoke with Ailin with [facility] regarding ENT referral - states Vlad is bedridden and she is unsure how they would get him to an appointment.     Spoke with Specialty  with [facility] - protocol is for Provider who placed trach to follow up with patient.     I was unable to find specific details in history. Left a message on [facility]'s voicemail to call back. [facility]    Please advise if there is a particular Provider you are aware of that will do Home Health Care visit for this?

## 2018-10-16 NOTE — PROGRESS NOTES
Subjective: Vlad Gleason is a 68 year old who is seen at home for follow up visit today.  Seen at [address] [place] [id number].      Trach: Continues to have reddened skin around right side of trach tie.  Patient still complaining of pain around his trach.  Staff report redness has improved, but requesting ENT referral for further evaluation.  They would also like to have something more to help with pain control.  Currently has 1 narcotic pain medication PRN pain.  Reports he asks for this about once a week.  Staff feel that some of his body language may indicate that he is in more pain than will tell staff.    Right great toe: drainage and pain around medial nail consistent with prior ingrown toes.  Patient complains of pain with touching this area.  Have been trying to wrap warm towels around his feet a couple times day to help.  No warmth to touch or extending redness.  No fevers or chills.      Patient has been unable to see a dentist.  Has a single front tooth that is very loose and staff are concerned that he will swallow this.      Increased secretions from trach.  RT did see patient today and recommends hypertonic saline to help with this.  Patient denies cough or changes in his breathing from baseline.  He does drink a lot of tomato juice, so secretions are orange/red tinged.    He is worried about having Parkinson's.  Notes he thinks his mother has this.  Over the last few days as felt as though his arms were shaking.  This has happened daily and lasts for a few minutes.  No tongue biting or problems with mentation during or after these episodes.  No history of seizures.  Staff is unaware of these movements.    Staff requesting to have PRN enema as patient has inconsistent stools.  Given body habitus is hard to roll and transfer patient.    Chronic and Past Medical Problems include:  Patient Active Problem List   Diagnosis     Chronic respiratory failure with hypoxia and hypercapnia (H)      Obesity hypoventilation syndrome (H)     Dysphagia     Urinary retention     Sacral wound     Major depressive disorder with psychotic features (H)     Paroxysmal atrial fibrillation (H)     Surgical wound, non healing     Primary insomnia     Social History:   Current activities:  bedbound  Currently living family/friends:  Lives in group home  PMHX/PSHX/MEDS/ALLERGIES/SHX/FHX reviewed and updated in Epic.   MEDICATION LIST:  Current Outpatient Prescriptions   Medication     terbinafine (LAMISIL AT) 1 % cream     acetaminophen (TYLENOL) 32 mg/mL solution     albuterol (2.5 MG/3ML) 0.083% neb solution     ascorbic acid (VITAMIN C) 500 MG tablet     aspirin 81 MG chewable tablet     brimonidine (ALPHAGAN) 0.2 % ophthalmic solution     brimonidine (ALPHAGAN) 0.2 % ophthalmic solution     budesonide (PULMICORT) 0.5 MG/2ML neb solution     carvedilol (COREG) 3.125 MG tablet     diphenhydrAMINE (BENADRYL) 25 MG tablet     Docusate Sodium (DIOCTO) 150 MG/15ML LIQD     doxycycline (VIBRAMYCIN) 100 MG capsule     EPINEPHrine PF (ADRENALIN) 1 MG/ML injection     ferrous sulfate 220 (44 FE) MG/5ML ELIX     fluticasone (FLONASE) 50 MCG/ACT spray     loratadine (CLARITIN) 10 MG tablet     melatonin 3 MG tablet     multivitamin, therapeutic (THERA-VIT) TABS tablet     nitroGLYcerin (NITROSTAT) 0.4 MG sublingual tablet     olopatadine (PATANOL) 0.1 % ophthalmic solution     omeprazole (PRILOSEC) 2 mg/mL SUSP     order for DME     order for DME     order for DME     order for DME     oxybutynin (DITROPAN XL) 10 MG 24 hr tablet     oxyCODONE IR (ROXICODONE) 5 MG tablet     polyethylene glycol 3350 POWD     QUEtiapine (SEROQUEL) 100 MG tablet     Silver Nitrate 10 % OINT     traZODone (DESYREL) 50 MG tablet     venlafaxine (EFFEXOR) 75 MG tablet     No current facility-administered medications for this visit.      Medications are managed by:  SELF, FAMILY, HOME NURSE  Patient uses a pill box to manage their medications:  Nurse  management   Patient has questions, concerns, or potential side effects/interactions from their medications:  No  GERIATRIC ROS:    Do you have difficulty getting around, watching TV or reading because of poor eyesight? No   Can you hear normal conversational voice? Yes   Do you use hearing aides? No   Do you have problems with your memory? Yes, not entirely remembering habits or significant events in his care   Do you often feel sad or depressed? Yes / No   Have you unintentionally lost weight in the last 6 months? No   Do you have trouble with control of your bladder? Yes, suprapubic catheter in place due to lower tract obstruction   Do you have trouble with control of your bowels? No   Have you had any falls in the past year? No   How many? n/a     GENERAL ROS:   General: No unexplained weight gain or loss; adequate sleep pattern.  Resp: No worsening shortness of breath, cough or hemoptysis.   GI: No worsening constipation, no diarrhea, no nausea or vomiting  : Voiding independently with no significant incontinence   Skin: No areas of new skin breakdown or worrisome rashes  Extremities:  No pain, extremity weakness or balance troubles     Objective: HR: 73, RR: 16 T: 98.4   Gen: Well nourished and in NAD   HEENT: Head is atraumatic; trach tube in place held by standard neck strap; small guaze squares protect edges of stoma from trach tube [place], very poor dentition with very loose right front tooth  CV: RRR - no murmurs, rubs, or gallups  Pulm: CTAB, no wheezes/rales/rhonchi, distant lung sounds given body habitus  ABD: soft, NTTP, bandage in RUQ with mild keloid-like scar tissue medial to this bandage, PEG and suprapubic cathter in place.  Skin: mild erythema and skin flaking on right side of neck around transition area of trach tie   Extrem: no cyanosis, edema or clubbing; all toe nails dystrophic, long and thickened.  Right great toe with redness and serosanguinous drainage consistent with early ingrown  toenail.  No purulent drainage, or warmth to touch.  Tender to palpation.  Psych: Euthymic  Neuro: spontaneous movements of all extremities.  No focal deficits.  No shaking or tremors    Preventative Screening:   Vaccinations reviewed and up to date: NO - PCV13 and Tdap     Code status: Full  Healthcare Directive, Living Will, POLST has been completed:  Adv. Direc. 9/20/2017      Assessment/ Plan:  1. Obesity hypoventilation syndrome (H)    2. Tracheostomy in place (H): Staff report that skin irritation and redness is improving, but his pain continues.  Continue to change trach every 3 days.  Difficulties with increased secretions.  - ENT referral placed  -7% hypertonic saline  -Stop Mucomyst for now  - terbinafine (LAMISIL AT) 1 % cream; Apply topically 2 times daily Neck around trach.  Dispense: 12 g; Refill: 1    3. Dystrophic nail: Nails were trimmed today.  No need for antibiotics, but early ingrown toe of right great toe.  Continue warm, wet towel wraps to feet at least BID.    4.  Poor dentition, loose right front tooth: Will see if there is a mobile dentist to see patient.  If not, could plan for dental block and pull tooth at return visit.  Would need to bring these supplies with.    Okay to have PRN enema for constipation.  Oxycodone increased to up to 2 tablets a day for pain.  Discussed for staff to document each time medication is given, and if there is a response to this treatment.  Will continue to evaluate to be sure that patient's pain is appropriately and safely treated.       RECOMMENDED FOLLOW UP:  2 months.    Level of Service:  13598    Total of 45 minutes was spent in face to face contact with patient with > 50% in counseling and coordination of care.  Options for treatment and/or follow-up care were reviewed with the patient. Vlad Gleason was engaged and actively involved in the decision making process. He verbalized understanding of the options discussed and was satisfied with the  final plan.      Tisha Conti    Discussed with Dr. Mack.

## 2018-10-17 NOTE — PROGRESS NOTES
1) Contact Vlad (and/or Vlad's care staff).  Offer to make an appointment with [facility]. If Vlad declines to be transported to this visit, document in chart.    If Vlad declines to be transported:  2) contact [facility] and ask if they are aware of any wound care nurse that would travel to Vlad's group home to evaluate issues related to trach care, on a patient [facility] placed a trach.    3) If [facility] is not aware of a service, contact [facility] and ask if they are aware of service noted in #2 provided in the [place] area.    Jonathan Lebron MD

## 2018-10-23 ENCOUNTER — MEDICAL CORRESPONDENCE (OUTPATIENT)
Dept: HEALTH INFORMATION MANAGEMENT | Facility: CLINIC | Age: 68
End: 2018-10-23

## 2018-10-24 ENCOUNTER — MEDICAL CORRESPONDENCE (OUTPATIENT)
Dept: HEALTH INFORMATION MANAGEMENT | Facility: CLINIC | Age: 68
End: 2018-10-24

## 2018-11-18 ENCOUNTER — TRANSFERRED RECORDS (OUTPATIENT)
Dept: HEALTH INFORMATION MANAGEMENT | Facility: CLINIC | Age: 68
End: 2018-11-18

## 2018-11-26 ENCOUNTER — TELEPHONE (OUTPATIENT)
Dept: FAMILY MEDICINE | Facility: CLINIC | Age: 68
End: 2018-11-26

## 2018-11-26 DIAGNOSIS — S31.000S WOUND OF SACRAL REGION, SEQUELA: ICD-10-CM

## 2018-11-26 RX ORDER — OXYCODONE HYDROCHLORIDE 5 MG/1
5 TABLET ORAL 2 TIMES DAILY PRN
Qty: 30 TABLET | Refills: 0 | Status: SHIPPED | OUTPATIENT
Start: 2018-11-26 | End: 2019-06-25

## 2018-11-26 NOTE — PROGRESS NOTES
Ongoing need for narcotics to manage pain from dressing changes.  Patient with morbid obesity, on ventilator. Does not leave group residence. Has RN staff managing his care 24/7.  Care plan is to use prn oxycodone prior to dressing change when he has severe pain. MAR is reviewed at home visit every 60 days.  Use of narcotic, and it's effectiveness is reviewed. Use appears appropriate. Needs PRN dose 4-8 times a week.   website checked. No issues noted.    #30 oxycodone prescription produced, faxed and mailed to [facility].  [facility] will have [facility] pharmacy fax [facility] when #5 left in the supply. Myself, or [facility] covering faculty, will review above issues and respond to the request.    Jonathan Mack MD

## 2018-11-26 NOTE — TELEPHONE ENCOUNTER
[facility] phone call message- patient requesting a refill:    Full Medication Name: oxyCODONE IR (ROXICODONE) 5 MG tablet    Dose: Take 1 tablet (5 mg) by mouth daily as needed for pain or other (Prior to dressing change IF in severe pain.  Please doument pain level prior ro Rx administartion and one hour after.) Use only for severe pain - Oral    Pharmacy confirmed as   [facility] - [place], [place] - [address] [facility] [address] [address] [facility] [address]  Phone: [phone] Fax: [phone]  : Yes    Additional Comments: NONE     OK to leave a message on voice mail? Yes    Primary language: English      needed? No    Call taken on November 26, 2018 at 9:42 AM by Jocy Borges

## 2018-11-27 NOTE — TELEPHONE ENCOUNTER
Prescription for #30 oxycodone produced, printed, signed and Mailed to [facility] by November Paw 11/26/2018.    Continue present care plan to manage Vlad's pain.    Jonathan Mack MD

## 2018-11-30 ENCOUNTER — TELEPHONE (OUTPATIENT)
Dept: FAMILY MEDICINE | Facility: CLINIC | Age: 68
End: 2018-11-30

## 2018-11-30 NOTE — TELEPHONE ENCOUNTER
RN calling to notify Dr. Mack that [facility] has faxed over orders to continue oxygen therapy.   Fyi.     Routed to Dr. Mack. /MONICA Salazar

## 2018-11-30 NOTE — TELEPHONE ENCOUNTER
[facility] phone call message- general phone call:    Reason for call: She needs a call back re his oxygen.    Return call needed: Yes    OK to leave a message on voice mail? Yes    Primary language: English      needed? No    Call taken on November 30, 2018 at 9:32 AM by Latricia Mercado

## 2018-12-03 ENCOUNTER — MEDICAL CORRESPONDENCE (OUTPATIENT)
Dept: HEALTH INFORMATION MANAGEMENT | Facility: CLINIC | Age: 68
End: 2018-12-03

## 2018-12-05 ENCOUNTER — MEDICAL CORRESPONDENCE (OUTPATIENT)
Dept: HEALTH INFORMATION MANAGEMENT | Facility: CLINIC | Age: 68
End: 2018-12-05

## 2018-12-07 ENCOUNTER — TELEPHONE (OUTPATIENT)
Dept: FAMILY MEDICINE | Facility: CLINIC | Age: 68
End: 2018-12-07

## 2018-12-07 NOTE — TELEPHONE ENCOUNTER
Routed to November--Please find chart notes and send to oxygen company-see previous note.    /MONICA Salazar

## 2018-12-07 NOTE — TELEPHONE ENCOUNTER
[facility] phone call message- general phone call:    Reason for call: Calling to let  know that they received physicians orders to continue oxygen but insurance company needs chart notes included. If any questions can call back.     Return call needed: No    OK to leave a message on voice mail? Yesp    Primary language: English      needed? No    Call taken on December 7, 2018 at 12:07 PM by Grisel Flores-Cardona

## 2018-12-08 ENCOUNTER — MEDICAL CORRESPONDENCE (OUTPATIENT)
Dept: HEALTH INFORMATION MANAGEMENT | Facility: CLINIC | Age: 68
End: 2018-12-08

## 2018-12-11 ENCOUNTER — TRANSFERRED RECORDS (OUTPATIENT)
Dept: HEALTH INFORMATION MANAGEMENT | Facility: CLINIC | Age: 68
End: 2018-12-11

## 2018-12-13 NOTE — TELEPHONE ENCOUNTER
"Vlad was seen by me Wednesday December 5th, 2018. He is using oxygen.  O2 sat on room air was 70%, in chronic stable state.    Awaiting \"Certificate of Medical Necessity CMS-484 Oxygen\" from supplier.    Jonathan Mack MD  "

## 2018-12-14 ENCOUNTER — TELEPHONE (OUTPATIENT)
Dept: FAMILY MEDICINE | Facility: CLINIC | Age: 68
End: 2018-12-14

## 2018-12-14 NOTE — TELEPHONE ENCOUNTER
[facility] phone call message- general phone call:    Reason for call: Nurse is calling to let  and the nurse know that she faxed over orders for a new bed. If  can sign off on it and fax it back to [facility] [phone].    Return call needed: No    OK to leave a message on voice mail? Yes    Primary language: English      needed? No    Call taken on December 14, 2018 at 3:09 PM by Grisel Flores-Cardona

## 2018-12-14 NOTE — TELEPHONE ENCOUNTER
The doctor's note from 12/12/2018 was faxed to [facility].  Abdi, RADHAA

## 2018-12-14 NOTE — TELEPHONE ENCOUNTER
Your note from 12/12/2018 was faxed to [facility] at [phone]

## 2018-12-18 ENCOUNTER — MEDICAL CORRESPONDENCE (OUTPATIENT)
Dept: HEALTH INFORMATION MANAGEMENT | Facility: CLINIC | Age: 68
End: 2018-12-18

## 2018-12-18 ENCOUNTER — TELEPHONE (OUTPATIENT)
Dept: FAMILY MEDICINE | Facility: CLINIC | Age: 68
End: 2018-12-18

## 2018-12-18 DIAGNOSIS — Z93.0 TRACHEOSTOMY IN PLACE (H): ICD-10-CM

## 2018-12-18 RX ORDER — PRENATAL VIT 91/IRON/FOLIC/DHA 28-975-200
COMBINATION PACKAGE (EA) ORAL 2 TIMES DAILY
Qty: 30 G | Refills: 1 | Status: SHIPPED | OUTPATIENT
Start: 2018-12-18 | End: 2019-12-19

## 2018-12-18 NOTE — TELEPHONE ENCOUNTER
Called [facility] and had nurse refax Physician order form for a bariatric bed and air matteress that goes with it for patient.  Dr. Campos signed orders and form was faxed back to [phone].    Routed to Dr. Mack/MERRITT Marino RN

## 2018-12-18 NOTE — TELEPHONE ENCOUNTER
Medical records- could you see if this letter has been faxed or placed in Dr. Mack's box.  If located please bring to RN office.    Also called [facility] back and requested that they resend the order to RN.    Routed to medical records/MERRITT Marino RN

## 2018-12-18 NOTE — TELEPHONE ENCOUNTER
[facility] phone call message- patient requesting a refill:    Full Medication Name:    terbinafine (LAMISIL AT) 1 % cream    Pharmacy confirmed as   [facility] - [place], [place] - [address] [facility]  [address] [facility] [address]  Phone: [phone] Fax: [phone]    [facility] - [place], [place] - [address]  [address]  [facility] [address]  Phone: [phone] Fax: [phone]  : Yes    Additional Comments:    Please refill RX above.     OK to leave a message on voice mail? Yes    Primary language: English      needed? No    Call taken on December 18, 2018 at 12:19 PM by Saritha De La Torre

## 2018-12-21 ENCOUNTER — MEDICAL CORRESPONDENCE (OUTPATIENT)
Dept: HEALTH INFORMATION MANAGEMENT | Facility: CLINIC | Age: 68
End: 2018-12-21

## 2018-12-27 ENCOUNTER — DOCUMENTATION ONLY (OUTPATIENT)
Dept: FAMILY MEDICINE | Facility: CLINIC | Age: 68
End: 2018-12-27

## 2018-12-27 DIAGNOSIS — Z93.0 TRACHEOSTOMY IN PLACE (H): Primary | ICD-10-CM

## 2018-12-27 DIAGNOSIS — J96.11 CHRONIC RESPIRATORY FAILURE WITH HYPOXIA AND HYPERCAPNIA (H): ICD-10-CM

## 2018-12-27 DIAGNOSIS — R09.02 HYPOXIA: ICD-10-CM

## 2018-12-27 DIAGNOSIS — J96.12 CHRONIC RESPIRATORY FAILURE WITH HYPOXIA AND HYPERCAPNIA (H): ICD-10-CM

## 2018-12-27 DIAGNOSIS — J44.9 CHRONIC OBSTRUCTIVE PULMONARY DISEASE, UNSPECIFIED COPD TYPE (H): ICD-10-CM

## 2018-12-27 DIAGNOSIS — E66.2 OBESITY HYPOVENTILATION SYNDROME (H): ICD-10-CM

## 2018-12-27 NOTE — PROGRESS NOTES
To whom it may concern:    Vlad was seen for a Home Visit December 5th, 2018.    The primary reason for the visit was to assess the diagnosis listed in this note.    Vlad is presently using home oxygen during the daytime and nighttime.  Without the oxygen supplementation, staff has witness desaturation in the 70's    The plan is to continue home oxygen supplementation as ordered, along with the other medication treatments and interventions.    Jonathan Mack MD

## 2018-12-31 ENCOUNTER — MEDICAL CORRESPONDENCE (OUTPATIENT)
Dept: HEALTH INFORMATION MANAGEMENT | Facility: CLINIC | Age: 68
End: 2018-12-31

## 2019-01-03 NOTE — PROGRESS NOTES
Patient is currently admitted to hospital, so still unsure if he will be seen.  Lesly Hutton, TEOFILO

## 2019-01-07 ENCOUNTER — MEDICAL CORRESPONDENCE (OUTPATIENT)
Dept: HEALTH INFORMATION MANAGEMENT | Facility: CLINIC | Age: 69
End: 2019-01-07

## 2019-01-08 ENCOUNTER — MEDICAL CORRESPONDENCE (OUTPATIENT)
Dept: HEALTH INFORMATION MANAGEMENT | Facility: CLINIC | Age: 69
End: 2019-01-08

## 2019-01-08 ENCOUNTER — DOCUMENTATION ONLY (OUTPATIENT)
Dept: FAMILY MEDICINE | Facility: CLINIC | Age: 69
End: 2019-01-08

## 2019-01-08 DIAGNOSIS — N20.1 BILATERAL URETERAL CALCULI: Primary | ICD-10-CM

## 2019-01-08 DIAGNOSIS — Z96.0 S/P URETERAL STENT PLACEMENT: ICD-10-CM

## 2019-01-08 DIAGNOSIS — R45.1 AGITATION: ICD-10-CM

## 2019-01-08 DIAGNOSIS — R13.10 DYSPHAGIA, UNSPECIFIED TYPE: ICD-10-CM

## 2019-01-09 ENCOUNTER — MEDICAL CORRESPONDENCE (OUTPATIENT)
Dept: HEALTH INFORMATION MANAGEMENT | Facility: CLINIC | Age: 69
End: 2019-01-09

## 2019-01-10 RX ORDER — GABAPENTIN 300 MG/1
300 CAPSULE ORAL 3 TIMES DAILY
Qty: 90 CAPSULE | Refills: 3
Start: 2019-01-10 | End: 2019-09-04

## 2019-01-10 RX ORDER — CEFPODOXIME PROXETIL 200 MG/1
200 TABLET, FILM COATED ORAL 2 TIMES DAILY
Qty: 42 TABLET | Refills: 0
Start: 2019-01-04 | End: 2019-06-25

## 2019-01-10 NOTE — PROGRESS NOTES
Subjective: Vlad Gleason is a 68 year old who is seen at home for follow up visit today. Seen at [address] [place] [address].    Also present at visit include home RN.   Acute concerns today include:   # Follow up hospital discharge. Patient was admitted to [facility] between 12/30/18 and 1/4/19. He presented after being found unresponsive to verbal command at his group home, and found to have UTI w/ sepsis secondary to bilateral ureteral stones s/p bilateral stents and to complete a 21 day course of vantin. Plans to follow up with Urology in on 1/30/19 for stent removal and definitive treatment. Currently denies any distressing symptoms including pain, fevers, chills, nausea, vomiting, trouble breathing, chest pain. Urinary catheter in place. Of note, patient cannot recall why he was hospitalized or where.   # New nectar-thick liquid diet. Had a swallow study and SLP eval when hospitalized, and discharged on NDD2/nectar thick diet. Also has g-tube in place and getting regular flushes. Was recommended to start tube feeds, but patient declined. RN reports that patient has had a poor appetite and doesn't enjoy his new diet.   # Consideration for palliative care. Discussed with patient some goals of care but could not get much coherent conversation today.   # Discharge medication reconciliation. Several of patient's home and PRN medications were changed, will update today.     Chronic and Past Medical Problems include:  Patient Active Problem List   Diagnosis     Chronic respiratory failure with hypoxia and hypercapnia (H)     Obesity hypoventilation syndrome (H)     Dysphagia     Urinary retention     Sacral wound     Major depressive disorder with psychotic features (H)     Paroxysmal atrial fibrillation (H)     Surgical wound, non healing     Primary insomnia     Social History:   Current activities: Enjoys writing numbers in his notebook  Support services: Unsure  Currently living family/friends:  Unsure  PMHX/PSHX/MEDS/ALLERGIES/SHX/FHX reviewed and updated in Epic.     MEDICATION LIST:  Current Outpatient Medications   Medication     acetaminophen (TYLENOL) 32 mg/mL solution     albuterol (2.5 MG/3ML) 0.083% neb solution     ascorbic acid (VITAMIN C) 500 MG tablet     aspirin 81 MG chewable tablet     brimonidine (ALPHAGAN) 0.2 % ophthalmic solution     brimonidine (ALPHAGAN) 0.2 % ophthalmic solution     budesonide (PULMICORT) 0.5 MG/2ML neb solution     carvedilol (COREG) 3.125 MG tablet     diphenhydrAMINE (BENADRYL) 25 MG tablet     Docusate Sodium (DIOCTO) 150 MG/15ML LIQD     doxycycline (VIBRAMYCIN) 100 MG capsule     EPINEPHrine PF (ADRENALIN) 1 MG/ML injection     ferrous sulfate 220 (44 FE) MG/5ML ELIX     fluticasone (FLONASE) 50 MCG/ACT spray     ipratropium - albuterol 0.5 mg/2.5 mg/3 mL (DUONEB) 0.5-2.5 (3) MG/3ML neb solution     loratadine (CLARITIN) 10 MG tablet     melatonin 3 MG tablet     multivitamin, therapeutic (THERA-VIT) TABS tablet     nitroGLYcerin (NITROSTAT) 0.4 MG sublingual tablet     olopatadine (PATANOL) 0.1 % ophthalmic solution     omeprazole (PRILOSEC) 2 mg/mL SUSP     order for DME     order for DME     order for DME     order for DME     oxybutynin (DITROPAN XL) 10 MG 24 hr tablet     oxyCODONE (ROXICODONE) 5 MG tablet     polyethylene glycol 3350 POWD     QUEtiapine (SEROQUEL) 100 MG tablet     Silver Nitrate 10 % OINT     terbinafine (LAMISIL AT) 1 % external cream     traZODone (DESYREL) 50 MG tablet     venlafaxine (EFFEXOR) 75 MG tablet     No current facility-administered medications for this visit.      Medications are managed by: SELF, FAMILY, HOME NURSE  Patient uses a pill box to manage their medications: RN assists  Patient has questions, concerns, or potential side effects/interactions from their medications: No    GERIATRIC ROS:    Can you hear normal conversational voice? Yes    Do you use hearing aides? No  Do you have problems with your memory? Yes   Do  you often feel sad or depressed? Did not ask  Have you unintentionally lost weight in the last 6 months? No   Do you have trouble with control of your bladder? Yes   Do you have trouble with control of your bowels? Yes   Have you had any falls in the past year? Did not ask    GENERAL ROS:   General: No unexplained weight gain or loss; adequate sleep pattern.  Resp: No worsening shortness of breath, cough or hemoptysis.   GI: Some constipation; no diarrhea, no nausea or vomiting  : Urinary catheter in place  Skin: No areas of new skin breakdown or worrisome rashes  Extremities: No pain, extremity weakness or balance troubles    Objective: There were no vitals taken for this visit.   Gen: Well nourished and in NAD, oriented to month and year and location  HEENT: Head is atraumatic, decent dentition  CV: RRR - no murmurs, rubs, or gallups,   Pulm: CTAB, no wheezes/rales/rhonchi, good air entry   ABD: soft, nontender, BS intact  Extrem: no cyanosis, edema or clubbing   Psych: Euthymic  Neuro: Pt is able to ambulate independently    Preventative Screening:   Vaccinations reviewed and up to date  Get up and Go test:  Not Applicable.    Additional Recommended Screening: none  Code status: FULL     Assessment/ Plan:  # Follow up hospital discharge. Doing well since discharge. Follow up with Urology in on 1/30/19 for stent removal and definitive treatment. Complete 21 days of Vantin (updated med list).  # New nectar-thick liquid diet. Encourage NDD2/nectar thick diet. Also has g-tube in place and getting regular flushes and PO meds. May need to reconsider goals of care with aspiration risk.    # Consideration for palliative care. Consider palliative care referral at next visit.   # Discharge medication reconciliation. Several of patient's home and PRN medications were changed, will update today with nursing staff. Discontinued mucomyst, start NaCl 7% vial per neb BID, all PO meds should be given via g-tube except  oxybutynin. Oxycodone 5mg BID PRN may need to be adjusted since was previously at higher dose. Was started on Gabapentin 300mg TID for agitation (updated med list).       LOS: 33036  RECOMMENDED FOLLOW UP:  2 months.  Total of 45 minutes was spent in face to face contact with patient with > 50% in counseling and coordination of care.  Options for treatment and/or follow-up care were reviewed with the patient. Vlad Gleason was engaged and actively involved in the decision making process. He verbalized understanding of the options discussed and was satisfied with the final plan.    Discussed and seen with Dr. Mack.     Drake Roland

## 2019-01-11 ENCOUNTER — TELEPHONE (OUTPATIENT)
Dept: FAMILY MEDICINE | Facility: CLINIC | Age: 69
End: 2019-01-11

## 2019-01-11 NOTE — TELEPHONE ENCOUNTER
[facility] phone call message- general phone call:    Reason for call: Faxed over some orders on 01/09/2019, wondering when will orders be signed and returned.     Return call needed: Yes    OK to leave a message on voice mail? Yes    Primary language: English      needed? No    Call taken on January 11, 2019 at 12:18 PM by Grisel Flores-Cardona

## 2019-01-13 ENCOUNTER — MEDICAL CORRESPONDENCE (OUTPATIENT)
Dept: HEALTH INFORMATION MANAGEMENT | Facility: CLINIC | Age: 69
End: 2019-01-13

## 2019-01-14 ENCOUNTER — MEDICAL CORRESPONDENCE (OUTPATIENT)
Dept: HEALTH INFORMATION MANAGEMENT | Facility: CLINIC | Age: 69
End: 2019-01-14

## 2019-01-14 NOTE — TELEPHONE ENCOUNTER
Noted in EHR that Dr. Mack had signed and orders scanned on 1/9/19 to [facility].    Will close encounter as [facility] has not returned phone call after message had been left by our clinic.    Routed to Dr. Mack/MERRITT Marino RN

## 2019-01-15 ENCOUNTER — TELEPHONE (OUTPATIENT)
Dept: FAMILY MEDICINE | Facility: CLINIC | Age: 69
End: 2019-01-15

## 2019-01-15 NOTE — TELEPHONE ENCOUNTER
[facility] phone call message- general phone call:    Reason for call: She needs a call back the patient is having surgery on the 30th but he does not want to come in to get a pre op.she also has questions about a prescription that was discontinued.    Return call needed: Yes    OK to leave a message on voice mail? Yes    Primary language: English      needed? No    Call taken on January 15, 2019 at 9:57 AM by Latricia Mercado

## 2019-01-15 NOTE — LETTER
[address] [address]  Phone: [phone]  Fax: [phone]    January 18, 2019        Vlad Gleason  [address] [facility] [address]          To whom it may concern:    RE: Vlad Chu was seen at [facility] on January 8th, 2019.  See Documentation Note for details.  He is medically optimized for his upcoming urologic procedure.    Please contact me for questions or concerns.      Sincerely,        Jonathan Mack MD

## 2019-01-15 NOTE — LETTER
[address] [address]  Phone: [phone]  Fax: [phone]    January 18, 2019        Vlad Gleason  [address] [address]          To whom it may concern:    RE: Vlad Gleason    Due to medical conditions of morbid obesity, hypoventilation syndrome and a chronic wound, Vlad needs to be transported by stretcher.  He is unable to sit upright for transport.    Please contact me for questions or concerns.      Sincerely,        Jonathan Mack MD

## 2019-01-15 NOTE — TELEPHONE ENCOUNTER
"Spoke with [facility] staff.  They have 3 questions that they are needing clarification or action:    1)  Oxycodone- patient had been on medication but was d/cd when he came home from the hospital.  They are wondering if provider wants to start that up again for patient.  They state that they didn't give to patient a lot but used it with some of his wound cares    2)  Patient has a 1/30/19 visit with the urologist clinic.  [facility] called [facility] and requested a stretcher to be used for transport.  [facility] is needing documentation from PCP on why patient needs a stretcher ( wounds, obese etc.)  This can be faxed to [phone].  Staff state there is no \"specific\" form to be filled out.  Just some form of documentation is needed.    3)  Urology clinic states that they need a Pre-op assessment done prior to the 1/30/19 visit.  [facility] staff note that provider was there on 1/8/19 but don't know if that visit will consitute a \"pre-op\" assessment.    Routed to Dr. Mack/MERRITT Marino RN    "

## 2019-01-18 ENCOUNTER — MEDICAL CORRESPONDENCE (OUTPATIENT)
Dept: HEALTH INFORMATION MANAGEMENT | Facility: CLINIC | Age: 69
End: 2019-01-18

## 2019-01-18 ENCOUNTER — TELEPHONE (OUTPATIENT)
Dept: FAMILY MEDICINE | Facility: CLINIC | Age: 69
End: 2019-01-18

## 2019-01-18 NOTE — TELEPHONE ENCOUNTER
"Spoke with Nursing staff Misrak and gave her Dr. Mack's orders:  1. discharge oxycodone  2.  Got fax number to fax letters for [facility] and Urologist office  3.  V.o. To stop ASA one week before procedure  4.  NPO after midnight (prior to procedure) except give carvedilol with sip of H2O the morning of procedure.    Nurse states that they are not sure if the procedure is going to be happening on this coming Monday Jan 21st as they can't find a stretcher ride to take him.  The 1/30/19 date they found out was for the \"f/u appointment\".  Asked that they let clinic know if pt. Igoes to have procedure on Monday Jan 21st.    Letters faxed.    Routed to Dr. Mack/MERRITT Marino RN    "

## 2019-01-18 NOTE — TELEPHONE ENCOUNTER
Received call from MONICA Shell at [facility] stating that patient is scheduled for a urology procedure on Monday at 7am.    Questions about medications and what should be held?  RNR at [facility] noted that patient is on asa.  Please advise.    Also noted that provider had seen patient around Jan 8th or 9th and was wondering if that visit would work as the pre-op assessment..    Will route to Dr. Mack to address and triage RN will then call [facility] back with his direction.    Routed to Dr. Mack/MERRITT Marino RN

## 2019-01-18 NOTE — TELEPHONE ENCOUNTER
"1) Discontinue oxycodone  2) see \"Letters\" for note to be sent to [facility] and the Urologist's office      3) Contact [facility]. Prior to procedure:  -stop aspirin one week before.  -NPO after midnight, except:  - give carvedilol with sip of water the morning of procedure    Jonathan Mack MD  "

## 2019-01-18 NOTE — TELEPHONE ENCOUNTER
[facility] phone call message- general phone call:    Reason for call: the home care nurse called to talk about the pre op that was done and to ask about stopping some of his medications the surgery is Monday     Return call needed: Yes    OK to leave a message on voice mail? Yes    Primary language: English      needed? No    Call taken on January 18, 2019 at 9:04 AM by Pierre Dukes

## 2019-01-23 ENCOUNTER — MEDICAL CORRESPONDENCE (OUTPATIENT)
Dept: HEALTH INFORMATION MANAGEMENT | Facility: CLINIC | Age: 69
End: 2019-01-23

## 2019-01-25 ENCOUNTER — MEDICAL CORRESPONDENCE (OUTPATIENT)
Dept: HEALTH INFORMATION MANAGEMENT | Facility: CLINIC | Age: 69
End: 2019-01-25

## 2019-01-29 ENCOUNTER — TELEPHONE (OUTPATIENT)
Dept: FAMILY MEDICINE | Facility: CLINIC | Age: 69
End: 2019-01-29

## 2019-01-29 NOTE — TELEPHONE ENCOUNTER
[facility] phone call message- general phone call:    Reason for call: Wanda is calling to let  know that pts pulse drops at night, and if he would like to do any med changes with the medication carvedilol (COREG) 3.125 MG tablet, Take 1 tablet (3.125 mg) by mouth 2 times daily (with meals) - Oral    Return call needed: Yes    OK to leave a message on voice mail? Yes    Primary language: English      needed? No    Call taken on January 29, 2019 at 11:50 AM by Grisel Flores-Cardona

## 2019-01-29 NOTE — TELEPHONE ENCOUNTER
Pt's  HR was 48-50 in the evenings and overnight. Pt has no new symptoms.     During the day, HR is 60s.     Routed to Dr. Mack. /MONICA Salazar

## 2019-01-29 NOTE — TELEPHONE ENCOUNTER
[facility] w/ RN---requesting pulse readings at night.     Routed to Dr. Mack. /MONICA Salazar

## 2019-01-30 ENCOUNTER — MEDICAL CORRESPONDENCE (OUTPATIENT)
Dept: HEALTH INFORMATION MANAGEMENT | Facility: CLINIC | Age: 69
End: 2019-01-30

## 2019-01-30 NOTE — TELEPHONE ENCOUNTER
"Please contact Vlad's nurse\"    \"Decrease coreg (carvedidol) to 1/2 tablet twice a day.  (1.5625 mg twice a day)\"    Jonathan Mack MD  "

## 2019-02-05 ENCOUNTER — TRANSFERRED RECORDS (OUTPATIENT)
Dept: HEALTH INFORMATION MANAGEMENT | Facility: CLINIC | Age: 69
End: 2019-02-05

## 2019-02-05 ENCOUNTER — TELEPHONE (OUTPATIENT)
Dept: FAMILY MEDICINE | Facility: CLINIC | Age: 69
End: 2019-02-05

## 2019-02-05 DIAGNOSIS — T81.89XS NON-HEALING SURGICAL WOUND, SEQUELA: ICD-10-CM

## 2019-02-05 DIAGNOSIS — E66.01 MORBID OBESITY (H): ICD-10-CM

## 2019-02-05 DIAGNOSIS — E66.2 OBESITY HYPOVENTILATION SYNDROME (H): Primary | ICD-10-CM

## 2019-02-05 NOTE — TELEPHONE ENCOUNTER
[facility] phone call message- general phone call:    Reason for call: They are in need of documentation for [facility] in order for the patient to get his Bariatric bed. If you have any questions you can call Ailin back otherwise just fax the documentation to [facility] at [phone].    Return call needed: No    OK to leave a message on voice mail? No    Primary language: English      needed? No    Call taken on February 5, 2019 at 3:13 PM by Jocy Borges

## 2019-02-06 ENCOUNTER — MEDICAL CORRESPONDENCE (OUTPATIENT)
Dept: HEALTH INFORMATION MANAGEMENT | Facility: CLINIC | Age: 69
End: 2019-02-06

## 2019-02-06 DIAGNOSIS — T81.89XS NON-HEALING SURGICAL WOUND, SEQUELA: ICD-10-CM

## 2019-02-06 DIAGNOSIS — E66.01 MORBID OBESITY (H): ICD-10-CM

## 2019-02-06 DIAGNOSIS — E66.2 OBESITY HYPOVENTILATION SYNDROME (H): ICD-10-CM

## 2019-02-06 NOTE — TELEPHONE ENCOUNTER
The note from 01/08/2019 along with the prescription of Bariatric Bed was faxed to [facility] at [phone].  Thank you.  JOE Patton

## 2019-02-06 NOTE — TELEPHONE ENCOUNTER
"November, could you print the DME order for bariatric  Bed I entered in chart?  Also print the 1/8/19\" Documentation Only\" note.    Fax these to [facility] (fax number listed in previous note)    Thanks,  Jonathan Mack  "

## 2019-02-06 NOTE — PROGRESS NOTES
Dr Mack not available to print here in clinic, and DME needs to be printed.  His order reprinted, signed by me, and given to staff to send

## 2019-02-14 ENCOUNTER — TELEPHONE (OUTPATIENT)
Dept: FAMILY MEDICINE | Facility: CLINIC | Age: 69
End: 2019-02-14

## 2019-02-14 NOTE — TELEPHONE ENCOUNTER
Called nursing staff and gave V.O. For right toe nail care:    1.  Soak once a day, 10 mins in lukewarm water; until clear  2.  Apply bacitracin ointment and cover daily.  In between soaks, until clear    This was given to nurse Jamar.  He read back instructions to ensure comprehension and understanding of orders.    MERRITT Marino RN

## 2019-02-14 NOTE — TELEPHONE ENCOUNTER
Please contact [facility]'s care staff with the following orders:    1) soak once a day, 10 minutes in lukewarm water; until clear  2) apply bacitracin ointment and cover daily. In between soaks, until clear    Jonathan Mack MD

## 2019-02-14 NOTE — TELEPHONE ENCOUNTER
[facility] phone call message- general phone call:    Reason for call: Pt R great toe is infected under nail. Pus and bleeding. Put Bacitracin on it and wrapped it. But oozes everyday.     Return call needed: Yes    OK to leave a message on voice mail? Yes    Primary language: English      needed? No    Call taken on February 14, 2019 at 12:48 PM by Anaid Bautista

## 2019-02-14 NOTE — TELEPHONE ENCOUNTER
Spoke with RN who stated that patient has been have a bad toe nail for a while but over the past couple of days it's been noticed that its been oozing blood and has pus under the toe nail.  RN stated that today she soaked the right foot and cleaned toe nail.  Trimmed nail a little.  The top of the right toe is reddened and some of the nail is  from the skin.  She placed some bacitracin on it and wrapped it but would like specific orders on how to care for it.  Patient has no fever or any other s/s.      Routed to Dr. Mack/MERRITT Marino RN

## 2019-02-15 ENCOUNTER — TELEPHONE (OUTPATIENT)
Dept: FAMILY MEDICINE | Facility: CLINIC | Age: 69
End: 2019-02-15

## 2019-02-15 NOTE — TELEPHONE ENCOUNTER
Spoke with nursing staff at [facility].  Patient is scheduled to have a urology procedure (Lithotripsy) on February 25th.  Per [facility] ([facility]) same day surgery - they are needing an up-dated pre-op assessment.    [facility] was wondering if provider will be coming there to see patient or if they have to make arrangements for patient to be seen in clinic.    Routed to Dr. Mack/MERRITT Marino RN

## 2019-02-15 NOTE — TELEPHONE ENCOUNTER
[facility] phone call message- general phone call:    Reason for call: He has surgery on the 25th of this month he needs a pre op she needs to know what to do with him.    Return call needed: Yes    OK to leave a message on voice mail? Yes    Primary language: English      needed? No    Call taken on February 15, 2019 at 11:32 AM by Latricia Mercado

## 2019-02-18 ENCOUNTER — MEDICAL CORRESPONDENCE (OUTPATIENT)
Dept: HEALTH INFORMATION MANAGEMENT | Facility: CLINIC | Age: 69
End: 2019-02-18

## 2019-02-19 ENCOUNTER — TRANSFERRED RECORDS (OUTPATIENT)
Dept: HEALTH INFORMATION MANAGEMENT | Facility: CLINIC | Age: 69
End: 2019-02-19

## 2019-02-21 ENCOUNTER — DOCUMENTATION ONLY (OUTPATIENT)
Dept: FAMILY MEDICINE | Facility: CLINIC | Age: 69
End: 2019-02-21

## 2019-02-21 NOTE — PROGRESS NOTES
Subjective: Vlad Gleason is a 68 year old who is seen at home for follow up visit today. Seen at [address] [place] [address] on 2/21/2019.    Also present at visit include home RN.   Acute concerns today include:   # Pre-op for upcoming Urologic Procedure.    . Patient was admitted to [facility] between 12/30/18 and 1/4/19. He presented after being found unresponsive to verbal command at his group home, and found to have UTI w/ sepsis secondary to bilateral ureteral stones s/p bilateral stents and to complete a 21 day course of vantin. Plans were to follow up with Urology in on 1/30/19 for stent removal and definitive treatment. Appointment changed to 2/25/2019 due to transportation issues.  Currently denies any distressing symptoms including pain, fevers, chills, nausea, vomiting, trouble breathing, chest pain. Urinary catheter in place. Of note, patient cannot recall why he was hospitalized or where; or for what reasons he is scheduled to undergo urologic procedure.  After discussing options, including the risks and benefits of not having the procedure,Vlad consented to have the procedure done.    # New nectar-thick liquid diet. Had a swallow study and SLP eval when hospitalized, and discharged on NDD2/nectar thick diet. Also has g-tube in place and getting regular flushes. Was recommended to start tube feeds, but patient declined. RN reports that patient has had a poor appetite and doesn't enjoy his new diet.     # Consideration for palliative care. Discussed with patient some goals of care but could not get much coherent conversation today.     # Discharge medication reconciliation. Several of patient's home and PRN medications were changed, will update today.      #face to face meeting to review the medical necessity of a Bariatric bed. Due to Morbid obesity, unable to get out of bed therefore spending all day and night in his bed, ongoing skin issues, Vlad requires specialized Bariatric  bed.    Chronic and Past Medical Problems include:      Patient Active Problem List   Diagnosis     Chronic respiratory failure with hypoxia and hypercapnia (H)     Obesity hypoventilation syndrome (H)     Dysphagia     Urinary retention     Sacral wound     Major depressive disorder with psychotic features (H)     Paroxysmal atrial fibrillation (H)     Surgical wound, non healing     Primary insomnia            Social History:   Current activities: Enjoys writing numbers in his notebook  Support services: Unsure  Currently living family/friends: Unsure  PMHX/PSHX/MEDS/ALLERGIES/SHX/FHX reviewed and updated in Epic.      MEDICATION LIST:      Current Outpatient Medications   Medication     acetaminophen (TYLENOL) 32 mg/mL solution     albuterol (2.5 MG/3ML) 0.083% neb solution     ascorbic acid (VITAMIN C) 500 MG tablet     aspirin 81 MG chewable tablet     brimonidine (ALPHAGAN) 0.2 % ophthalmic solution     brimonidine (ALPHAGAN) 0.2 % ophthalmic solution     budesonide (PULMICORT) 0.5 MG/2ML neb solution     carvedilol (COREG) 3.125 MG tablet     diphenhydrAMINE (BENADRYL) 25 MG tablet     Docusate Sodium (DIOCTO) 150 MG/15ML LIQD     doxycycline (VIBRAMYCIN) 100 MG capsule     EPINEPHrine PF (ADRENALIN) 1 MG/ML injection     ferrous sulfate 220 (44 FE) MG/5ML ELIX     fluticasone (FLONASE) 50 MCG/ACT spray     ipratropium - albuterol 0.5 mg/2.5 mg/3 mL (DUONEB) 0.5-2.5 (3) MG/3ML neb solution     loratadine (CLARITIN) 10 MG tablet     melatonin 3 MG tablet     multivitamin, therapeutic (THERA-VIT) TABS tablet     nitroGLYcerin (NITROSTAT) 0.4 MG sublingual tablet     olopatadine (PATANOL) 0.1 % ophthalmic solution     omeprazole (PRILOSEC) 2 mg/mL SUSP     order for DME     order for DME     order for DME     order for DME     oxybutynin (DITROPAN XL) 10 MG 24 hr tablet     oxyCODONE (ROXICODONE) 5 MG tablet     polyethylene glycol 3350 POWD     QUEtiapine (SEROQUEL) 100 MG tablet     Silver Nitrate 10 % OINT      terbinafine (LAMISIL AT) 1 % external cream     traZODone (DESYREL) 50 MG tablet     venlafaxine (EFFEXOR) 75 MG tablet      No current facility-administered medications for this visit.       Medications are managed by: SELF, FAMILY, HOME NURSE  Patient uses a pill box to manage their medications: RN assists  Patient has questions, concerns, or potential side effects/interactions from their medications: No     GERIATRIC ROS:    Can you hear normal conversational voice? Yes    Do you use hearing aides? No  Do you have problems with your memory? Yes   Do you often feel sad or depressed? Did not ask  Have you unintentionally lost weight in the last 6 months? No   Do you have trouble with control of your bladder? Yes   Do you have trouble with control of your bowels? Yes   Have you had any falls in the past year? Did not ask     GENERAL ROS:   General: No unexplained weight gain or loss; adequate sleep pattern.  Resp: No worsening shortness of breath, cough or hemoptysis.   GI: Some constipation; no diarrhea, no nausea or vomiting  : Urinary catheter in place  Skin: No areas of new skin breakdown or worrisome rashes  Extremities: No pain, extremity weakness or balance troubles     Objective: There were no vitals taken for this visit.   Gen: Well nourished and in NAD, oriented to month and year and location  HEENT: Head is atraumatic, decent dentition  CV: RRR - no murmurs, rubs, or gallups,   Pulm: CTAB, no wheezes/rales/rhonchi, good air entry   ABD: soft, nontender, BS intact  Extrem: no cyanosis, edema or clubbing   Psych: Euthymic  Neuro: Pt is able to ambulate independently     Preventative Screening:   Vaccinations reviewed and up to date  Get up and Go test:  Not Applicable.    Additional Recommended Screening: none  Code status: FULL     Assessment/ Plan:  # Follow up hospital discharge. Doing well since discharge. Urology on 2/25/19 for ?stent removal and definitive treatment. Completed 21 days of Vantin    # New nectar-thick liquid diet. Encourage NDD2/nectar thick diet. Also has g-tube in place and getting regular flushes and PO meds. May need to reconsider goals of care with aspiration risk.    #High risk for complications, but medically optimized for upcoming procedure  #DME for Bariatric Bed     LOS: 20722  RECOMMENDED FOLLOW UP:    1 month    Jonathan Mack MD      Progress Notes     Encounter Info:    Billing Info,    History,    Allergies,    Detailed Report       Encounter Information      Provider Department Encounter # [place]   1/8/2019 9:56 AM Jonathan Mack MD [facility] [id number] [facility] Owned   Reviewed this Encounter     None   Orders Placed      None   Medication Renewals and Changes         Cefpodoxime Proxetil 200 mg Oral 2 TIMES DAILY, For 21 days  (Completed early Feb 2019)      Gabapentin 300 mg Oral 3 TIMES DAILY              Medication List   Visit Diagnoses         Bilateral ureteral calculi      S/P ureteral stent placement      Dysphagia, unspecified type      Agitation

## 2019-02-22 ENCOUNTER — MEDICAL CORRESPONDENCE (OUTPATIENT)
Dept: HEALTH INFORMATION MANAGEMENT | Facility: CLINIC | Age: 69
End: 2019-02-22

## 2019-03-04 ENCOUNTER — MEDICAL CORRESPONDENCE (OUTPATIENT)
Dept: HEALTH INFORMATION MANAGEMENT | Facility: CLINIC | Age: 69
End: 2019-03-04

## 2019-03-05 ENCOUNTER — DOCUMENTATION ONLY (OUTPATIENT)
Dept: FAMILY MEDICINE | Facility: CLINIC | Age: 69
End: 2019-03-05

## 2019-03-06 ENCOUNTER — MEDICAL CORRESPONDENCE (OUTPATIENT)
Dept: HEALTH INFORMATION MANAGEMENT | Facility: CLINIC | Age: 69
End: 2019-03-06

## 2019-03-06 RX ORDER — GABAPENTIN 300 MG/1
300 CAPSULE ORAL 2 TIMES DAILY PRN
COMMUNITY
Start: 2019-03-06 | End: 2019-09-25

## 2019-03-07 NOTE — PROGRESS NOTES
Subjective: Vlad Gleason is a 68 year old who is seen at home for follow up visit today.  Seen at [address] [place] [address] .    Also present at visit include RN support staff  Acute concerns today include:  #Requesting bariatric bed paperwork be completed  #Patient is have increased pain and SOB with wound cares. Patient has significant ulcers on his back and bottom that require daily turning and dressing. Patient has significant pain with this and only has tylenol. Opiate have been discontinued in the past due to risk of respirtory depression in the setting of being tached. Patient has gabapentin and benadryl on his medications.   #Goals of care: discussed patient overall goals of care briefly. Patient stated that he would like to continue to have aggressive measures. He would like to remain full code at this time. Patient would like to focus on longevity and would want to be hospitalized if he needed this. Patient was very clear during our discussion.     Chronic and Past Medical Problems include:  Patient Active Problem List   Diagnosis     Chronic respiratory failure with hypoxia and hypercapnia (H)     Obesity hypoventilation syndrome (H)     Dysphagia     Urinary retention     Sacral wound     Major depressive disorder with psychotic features (H)     Paroxysmal atrial fibrillation (H)     Surgical wound, non healing     Primary insomnia       Social History:   Support services:  Patient lives at group home/hospital with full RN support  Currently living family/friends: none in the area   PMHX/PSHX/MEDS/ALLERGIES/SHX/FHX reviewed and updated in Epic.   MEDICATION LIST:  Current Outpatient Medications   Medication     acetaminophen (TYLENOL) 32 mg/mL solution     albuterol (2.5 MG/3ML) 0.083% neb solution     ascorbic acid (VITAMIN C) 500 MG tablet     aspirin 81 MG chewable tablet     brimonidine (ALPHAGAN) 0.2 % ophthalmic solution     brimonidine (ALPHAGAN) 0.2 % ophthalmic solution      budesonide (PULMICORT) 0.5 MG/2ML neb solution     carvedilol (COREG) 3.125 MG tablet     diphenhydrAMINE (BENADRYL) 25 MG tablet     Docusate Sodium (DIOCTO) 150 MG/15ML LIQD     EPINEPHrine PF (ADRENALIN) 1 MG/ML injection     ferrous sulfate 220 (44 FE) MG/5ML ELIX     fluticasone (FLONASE) 50 MCG/ACT spray     gabapentin (NEURONTIN) 300 MG capsule     ipratropium - albuterol 0.5 mg/2.5 mg/3 mL (DUONEB) 0.5-2.5 (3) MG/3ML neb solution     loratadine (CLARITIN) 10 MG tablet     melatonin 3 MG tablet     multivitamin, therapeutic (THERA-VIT) TABS tablet     nitroGLYcerin (NITROSTAT) 0.4 MG sublingual tablet     olopatadine (PATANOL) 0.1 % ophthalmic solution     omeprazole (PRILOSEC) 2 mg/mL SUSP     order for DME     order for DME     order for DME     order for DME     order for DME     oxybutynin (DITROPAN XL) 10 MG 24 hr tablet     oxyCODONE (ROXICODONE) 5 MG tablet     polyethylene glycol 3350 POWD     QUEtiapine (SEROQUEL) 100 MG tablet     Silver Nitrate 10 % OINT     terbinafine (LAMISIL AT) 1 % external cream     traZODone (DESYREL) 50 MG tablet     venlafaxine (EFFEXOR) 75 MG tablet     No current facility-administered medications for this visit.      Medications are managed by: HOME NURSE  Patient has questions, concerns, or potential side effects/interactions from their medications:  No  GENERAL ROS:   General: No unexplained weight gain or loss; adequate sleep pattern.  Resp: No worsening shortness of breath, cough, secretions or hemoptysis.   GI: No worsening constipation, no diarrhea, no nausea or vomiting  : Urinary catheter in place  Skin: No areas of new skin breakdown or worrisome rashes  Extremities:  No pain, extremity weakness or balance troubles    Objective:   Gen: Well nourished and in NAD   HEENT: Head is atraumatic, decent dentition  CV: RRR - no murmurs, rubs, or gallups,   Pulm: CTAB, no wheezes/rales/rhonchi, good air entry   ABD: soft, nontender, BS intact  Extrem: no cyanosis,  edema or clubbing   Psych: Euthymic  Neuro: quadriplegic     Code status: Full     Assessment/ Plan:  #Bariatric bed paperwork completed at the home by Dr. Thompson  #Pain with wound cares:  -Gabapentin 300 mg BID PRN provided 30-60 min prior to dressing changes.   -Use existing Benadryl PRN  -Continuing to avoid opiates due to risk of respiratory depression   #Goals of care: Patient would like to remain full code at this time. No palliative care referral.     RECOMMENDED FOLLOW UP:  2 months.    Level of Service:  21452    I discussed the patient with  who is in agreement with the assessment and plan.     Regina Mosqueda

## 2019-03-07 NOTE — PROGRESS NOTES
Preceptor Attestation:   Patient seen, evaluated and discussed with the resident. I have verified the content of the note, which accurately reflects my assessment of the patient and the plan of care.   Supervising Physician:  Serafin Thompson MD

## 2019-03-12 ENCOUNTER — MEDICAL CORRESPONDENCE (OUTPATIENT)
Dept: HEALTH INFORMATION MANAGEMENT | Facility: CLINIC | Age: 69
End: 2019-03-12

## 2019-03-20 ENCOUNTER — MEDICAL CORRESPONDENCE (OUTPATIENT)
Dept: HEALTH INFORMATION MANAGEMENT | Facility: CLINIC | Age: 69
End: 2019-03-20

## 2019-04-07 ENCOUNTER — MEDICAL CORRESPONDENCE (OUTPATIENT)
Dept: HEALTH INFORMATION MANAGEMENT | Facility: CLINIC | Age: 69
End: 2019-04-07

## 2019-04-11 ENCOUNTER — TRANSFERRED RECORDS (OUTPATIENT)
Dept: HEALTH INFORMATION MANAGEMENT | Facility: CLINIC | Age: 69
End: 2019-04-11

## 2019-04-11 ENCOUNTER — MEDICAL CORRESPONDENCE (OUTPATIENT)
Dept: HEALTH INFORMATION MANAGEMENT | Facility: CLINIC | Age: 69
End: 2019-04-11

## 2019-04-15 ENCOUNTER — MEDICAL CORRESPONDENCE (OUTPATIENT)
Dept: HEALTH INFORMATION MANAGEMENT | Facility: CLINIC | Age: 69
End: 2019-04-15

## 2019-04-23 ENCOUNTER — TELEPHONE (OUTPATIENT)
Dept: FAMILY MEDICINE | Facility: CLINIC | Age: 69
End: 2019-04-23

## 2019-04-23 NOTE — TELEPHONE ENCOUNTER
Received the approval note from [employer].  It said the approval is good until December 31, 2019.  Patient's pharmacy was called and notified.

## 2019-04-29 ENCOUNTER — MEDICAL CORRESPONDENCE (OUTPATIENT)
Dept: HEALTH INFORMATION MANAGEMENT | Facility: CLINIC | Age: 69
End: 2019-04-29

## 2019-05-07 ENCOUNTER — DOCUMENTATION ONLY (OUTPATIENT)
Dept: FAMILY MEDICINE | Facility: CLINIC | Age: 69
End: 2019-05-07

## 2019-05-07 DIAGNOSIS — E66.01 MORBID OBESITY (H): ICD-10-CM

## 2019-05-07 DIAGNOSIS — Z99.11 VENTILATOR DEPENDENT (H): Primary | ICD-10-CM

## 2019-05-07 DIAGNOSIS — S31.000D WOUND OF SACRAL REGION, SUBSEQUENT ENCOUNTER: ICD-10-CM

## 2019-05-07 NOTE — PROGRESS NOTES
Subjective: Vlad Gleason is a 68 year old who is seen at home for follow up visit today.  Seen at [address] [place], [place] [id number] .    Also present at visit include: RN support staff  Also present at visit include   Also present at visit include Dr. Jonathan Mack.  Acute concerns today include:     #Issues with bariatric bed being covered as patient is not 350 pounds. See # Next for more information.    #Difficulty with cares: Nursing staff is having a hard time rolling the patient as his shoulder is actually digging into the sides of the hospital bed that he has had his current living home.  This causes much pain as they tried to roll him.  This makes cares very difficult and causes the patient a lot of discomfort throughout the day.  He complains of arm pain and back discomfort for being stuck in relatively similar spots as he has limited options on how they can really situate him in bed.    #Large abdomen: Wound nurse and home RN nurse notes that while doing care as they noticed a bulge in the patient's right abdomen.  They cannot remember if it was present previously.  It seems to be larger than they remember.  Patient is having soft bowel movements 1-2 times a day almost every day.  No issues with constipation.  Mild nausea no vomiting.    #Foul smelling urine: Nursing notes foul-smelling urine.  He is due for a catheter change today.  He has been afebrile.  Not diaphoretic.  No other concerning symptoms at this time.    Chronic and Past Medical Problems include:  Patient Active Problem List   Diagnosis     Chronic respiratory failure with hypoxia and hypercapnia (H)     Obesity hypoventilation syndrome (H)     Dysphagia     Urinary retention     Sacral wound     Major depressive disorder with psychotic features (H)     Paroxysmal atrial fibrillation (H)     Surgical wound, non healing     Primary insomnia       Social History:   Current activities:  Bed bound. On a ventilator. Morbidly obese. BMI  50.  Support services:  Patient lives at group home/hospital with full RN support  Currently living family/friends:  None in the area  PMHX/PSHX/MEDS/ALLERGIES/SHX/FHX reviewed and updated in Epic.   MEDICATION LIST:  Current Outpatient Medications   Medication     order for DME     acetaminophen (TYLENOL) 32 mg/mL solution     albuterol (2.5 MG/3ML) 0.083% neb solution     ascorbic acid (VITAMIN C) 500 MG tablet     aspirin 81 MG chewable tablet     brimonidine (ALPHAGAN) 0.2 % ophthalmic solution     brimonidine (ALPHAGAN) 0.2 % ophthalmic solution     budesonide (PULMICORT) 0.5 MG/2ML neb solution     carvedilol (COREG) 3.125 MG tablet     diphenhydrAMINE (BENADRYL) 25 MG tablet     Docusate Sodium (DIOCTO) 150 MG/15ML LIQD     EPINEPHrine PF (ADRENALIN) 1 MG/ML injection     ferrous sulfate 220 (44 FE) MG/5ML ELIX     fluticasone (FLONASE) 50 MCG/ACT spray     gabapentin (NEURONTIN) 300 MG capsule     gabapentin (NEURONTIN) 300 MG capsule     ipratropium - albuterol 0.5 mg/2.5 mg/3 mL (DUONEB) 0.5-2.5 (3) MG/3ML neb solution     loratadine (CLARITIN) 10 MG tablet     melatonin 3 MG tablet     multivitamin, therapeutic (THERA-VIT) TABS tablet     nitroGLYcerin (NITROSTAT) 0.4 MG sublingual tablet     olopatadine (PATANOL) 0.1 % ophthalmic solution     omeprazole (PRILOSEC) 2 mg/mL SUSP     order for DME     order for DME     order for DME     order for DME     order for DME     oxybutynin (DITROPAN XL) 10 MG 24 hr tablet     oxyCODONE (ROXICODONE) 5 MG tablet     polyethylene glycol 3350 POWD     QUEtiapine (SEROQUEL) 100 MG tablet     Silver Nitrate 10 % OINT     terbinafine (LAMISIL AT) 1 % external cream     traZODone (DESYREL) 50 MG tablet     venlafaxine (EFFEXOR) 75 MG tablet     No current facility-administered medications for this visit.      Medications are managed by:  RN at facility  Patient uses a pill box to manage their medications:  No  Patient has questions, concerns, or potential side  effects/interactions from their medications:  Yes / No  GERIATRIC ROS:    Do you have difficulty getting around, watching TV or reading because of poor eyesight? No   Can you hear normal conversational voice? Yes  Do you use hearing aides? No   Have you unintentionally lost weight in the last 6 months? No   Do you have trouble with control of your bladder? Chronic nesbitt catheter in place, cares per RN staff   Do you have trouble with control of your bowels? Incontinent, cares per RN staff   Have you had any falls in the past year? Bed bound    GENERAL ROS:   General: No unexplained weight gain or loss; adequate sleep pattern.  Resp: No worsening shortness of breath, cough or hemoptysis.   GI: Positive for mild intermittent nausea. No worsening constipation, no diarrhea, no vomiting  : Patient has a nesbitt catheter in  Skin: Chronic wounds on his sacrum, thigh and abdomen  Extremities:  No pain.    Objective: Most recent weight:  335 two months ago. BMI 50  Gen: Morbidly obese. Well nourished and in NAD   HEENT: Head is atraumatic, decent dentition  CV: RRR - no murmurs, rubs, or gallups,   Pulm: Mild intermittent wheeze. No increase work of breathing. No rales/rhonchi, good air entry   ABD: Large hiatal hernia in the left mid to lower abdomen. Reducible. No evidence of incarceration or ischemia. Bowel sounds present. Remainder of abdomen exam shows evidence of an obese abdomen with areas of skin break down. Soft. Non tender.  Extrem: no cyanosis, edema or clubbing   Psych: Euthymic  Neuro: Bed bound    Preventative Screening:   Vaccinations reviewed and up to date: Yes  Code status: Full code  Goals of care: Long discussion was had with patient at last visit. He would like to remain full code. No palliative care referral.     Assessment/ Plan:  1. Ventilator dependent (H)  - order for DME; Equipment being ordered: Bariatric Group 2 matrices       2. Morbid obesity (H)  - order for DME; Equipment being ordered:  Bariatric Group 2 mattress  Nursing staff has had a very difficult time getting the bariatric bed covered.  Patient additionally needs a bariatric mattress.  Patient has a very large BMI of 50.  He would benefit significantly from a bariatric bed as they could roll him and have more room across his shoulder length.  The patient carries majority of his weight across the chest and abdomen.  Relatively speaking his legs are significantly smaller than his upper body.  His upper body body is causing majority of the issues as it is difficult roll him in bed as his arms are pushing into the bars. This subsequently causes more issues with wound care. He has wounds located on his sacrum, posterior proximal thigh and abdomen. It is important to get the bariatric bed to try and prevent any further skin trauma/skin break down.  - This note along with DME order for mattress will be sent to [facility]    3. Body mass index (BMI) of 50-59.9 in adult (H)  - order for DME; Equipment being ordered: Bariatric Group 2 mattress     4. Wound of sacral region, subsequent encounter  - order for DME; Equipment being ordered: Bariatric Group 2 mattress  - Patient has several wounds. Cares are very difficult please see above  -Patient is having a very difficult time tolerating cares.  PRN gabapentin was not covered as this makes it 5 times daily.  We will switch the patient to 300 mg of gabapentin in the a.m. and 600 in the p.m.  He then can have a 300 mg as needed dose in the afternoon if needed.  This will hopefully give the patient some more options when they are doing wound cares.    5. Large hiatal hernia: Patient with evidence of a large hiatal hernia on exam.  It is reducible.  There are bowel sounds in the hernia.  No signs of ischemia.  Hernia is not concerning at this time.  We will continue to closely monitor.  Discussed with nursing staff important red signs to notify us sooner.  He continues to have 1-2 soft bowel  movements a day.  He does not have significant pain over the hernia.  No vomiting.  Patient continues to tolerate feeds.    6. Foul smelling urine: Smell of urine is likely secondary to needing catheter changed.  He is having no signs of UTI at this point.  No fever or diaphoresis.  He does not have any complaints at this time should start to smell worse and does not improve with Narayanan change or if patient should develop any other symptoms nursing if urine was instructed to notify our clinic.    RECOMMENDED FOLLOW UP:  2 months.    Level of Service:  9921    Meghana Lamb    The patient was seen by and discussed with Jonathan Mack MD

## 2019-05-09 ENCOUNTER — TELEPHONE (OUTPATIENT)
Dept: FAMILY MEDICINE | Facility: CLINIC | Age: 69
End: 2019-05-09

## 2019-05-09 NOTE — TELEPHONE ENCOUNTER
Received a fax from [facility] requesting PA for Gabentin 300MG Capsules.  Do you want to do PA for this?  Thank you.  JOE Patton

## 2019-05-10 ENCOUNTER — MEDICAL CORRESPONDENCE (OUTPATIENT)
Dept: HEALTH INFORMATION MANAGEMENT | Facility: CLINIC | Age: 69
End: 2019-05-10

## 2019-05-17 ENCOUNTER — TELEPHONE (OUTPATIENT)
Dept: FAMILY MEDICINE | Facility: CLINIC | Age: 69
End: 2019-05-17

## 2019-05-17 NOTE — TELEPHONE ENCOUNTER
[facility] phone call message- patient requesting a refill:    Full Medication Name:     Budesonide 0.5mg/2ML     Sodium Chloride 7%    [phone]  [facility]    Additional Comments:     They are stating that the patient is out of these medications. However, the patient's insurance is not approving these medications.     OK to leave a message on voice mail? Yes    Primary language: English      needed? No    Call taken on May 17, 2019 at 9:19 AM by Steffanie Long

## 2019-05-20 ENCOUNTER — TELEPHONE (OUTPATIENT)
Dept: FAMILY MEDICINE | Facility: CLINIC | Age: 69
End: 2019-05-20

## 2019-05-20 NOTE — TELEPHONE ENCOUNTER
[facility] phone call message- patient requesting a refill:    Full Medication Name:budesonide (PULMICORT) 0.5 MG/2ML neb solution     Dose:Take 2 mLs (0.5 mg) by nebulization 2 times daily - Nebulization     Pharmacy confirmed as    [facility] - [place], [place] - [address]  [address]  [facility] [address]  Phone: [phone] Fax: [phone]  : Yes    Additional Comments: This patients medication is no longer covered by his insurance.  Insurance denied it and the pharmacy is unsure why.  A PA has already been tried.  Is there anything else Dr Mack can prescribe.    OK to leave a message on voice mail? Yes    Primary language: English      needed? No    Call taken on May 20, 2019 at 10:06 AM by Jocy Borges

## 2019-05-21 ENCOUNTER — TELEPHONE (OUTPATIENT)
Dept: FAMILY MEDICINE | Facility: CLINIC | Age: 69
End: 2019-05-21

## 2019-05-21 DIAGNOSIS — J44.9 CHRONIC OBSTRUCTIVE PULMONARY DISEASE, UNSPECIFIED COPD TYPE (H): Primary | ICD-10-CM

## 2019-05-21 NOTE — TELEPHONE ENCOUNTER
See previous note from .   Pt also has some pain around trach site, per HHN.   Pt has foul smelling urine.  No other new symptoms.     They are recommending urine test, if Dr. Mack thinks that is appropriate.     Routed to Dr. Mack. /MONICA Salazar

## 2019-05-21 NOTE — TELEPHONE ENCOUNTER
[facility] phone call message-patient reporting a symptom:     Symptom:     POLY  [phone]  [facility]      Pt has a low grade fever  99.2 -99.6 for about 2 days. He's on scheduled tylenol.  Respiration 14-16 now 18-22.     Same Day Visit Offered: None    Additional comments: None    OK to leave message on voice mail? Yes    Primary language: English      needed? No    Call taken on May 21, 2019 at 1:25 PM by Steffanie Long

## 2019-05-22 NOTE — TELEPHONE ENCOUNTER
"Vlad has been stable and using sodium Chloride and steroid Nebs for his COPD.    Now, apparently Vlad's insurance does not cover sodium chloride for Nebs or his Neb steroid. I prepared and sent a \"Prior Auth\".  Insurance denied coverage.    I sent for a MDI steroid Vlad's insurance WILL cover..    If this is not helpful, Vlad will have to buy the prescribed medications, paying out of pocket.    I called Vlad's group home 5/22/19 at 3pm to inform him and staff.    Jonathan Mack MD  "

## 2019-05-23 ENCOUNTER — MEDICAL CORRESPONDENCE (OUTPATIENT)
Dept: HEALTH INFORMATION MANAGEMENT | Facility: CLINIC | Age: 69
End: 2019-05-23

## 2019-05-24 ENCOUNTER — MEDICAL CORRESPONDENCE (OUTPATIENT)
Dept: HEALTH INFORMATION MANAGEMENT | Facility: CLINIC | Age: 69
End: 2019-05-24

## 2019-05-31 ENCOUNTER — MEDICAL CORRESPONDENCE (OUTPATIENT)
Dept: HEALTH INFORMATION MANAGEMENT | Facility: CLINIC | Age: 69
End: 2019-05-31

## 2019-06-04 ENCOUNTER — TRANSFERRED RECORDS (OUTPATIENT)
Dept: HEALTH INFORMATION MANAGEMENT | Facility: CLINIC | Age: 69
End: 2019-06-04

## 2019-06-05 ENCOUNTER — TRANSFERRED RECORDS (OUTPATIENT)
Dept: HEALTH INFORMATION MANAGEMENT | Facility: CLINIC | Age: 69
End: 2019-06-05

## 2019-06-06 ENCOUNTER — MEDICAL CORRESPONDENCE (OUTPATIENT)
Dept: HEALTH INFORMATION MANAGEMENT | Facility: CLINIC | Age: 69
End: 2019-06-06

## 2019-06-09 ENCOUNTER — TELEPHONE (OUTPATIENT)
Dept: FAMILY MEDICINE | Facility: CLINIC | Age: 69
End: 2019-06-09

## 2019-06-09 NOTE — TELEPHONE ENCOUNTER
[facility] Answering Service    Received call from [facility]. RN noticed new behaviors since yesterday, specifically anxiety and agitation. Has been crying out for help. States he was feeling anxious due to difficulty sleeping. Last night, he woke multiple times. Today he was agitated and anxious prior to wound cares but during cares became more so. Was hitting staff, which is not uncommon. Had an episode during turning in which he desatted to 70%, HR up to 109 and seemed less responsive for 10 seconds. His O2 was increased 2 L to 6 L for 7-8 minutes and then was able to be titrated back down to his usual settings of 95% on 2 L NC , pulse 76. He has been afebrile. No vomiting or diarrhea. Wounds appear stable. Currently, he is comfortable, alert and oriented. RN states this is her first time caring for him but she did discuss with other nurses. Discussed that I will pass the message along to his PCP and RN Triage.    Sheri Perez MD

## 2019-06-10 NOTE — TELEPHONE ENCOUNTER
Called [phone] and asked to speak to someone re: patient.  Was transferred to [place] .  [facility] as we are wanting to f/u on how patient is doing since concerns noted over the wkend.    MERRITT Marino RN

## 2019-06-12 NOTE — TELEPHONE ENCOUNTER
Behaviors note.  Will address this month at home visit.  Usual cares with transfer to ED if symptoms return and persist.  Jonathan Mack MD

## 2019-06-17 ENCOUNTER — MEDICAL CORRESPONDENCE (OUTPATIENT)
Dept: HEALTH INFORMATION MANAGEMENT | Facility: CLINIC | Age: 69
End: 2019-06-17

## 2019-06-25 ENCOUNTER — DOCUMENTATION ONLY (OUTPATIENT)
Dept: FAMILY MEDICINE | Facility: CLINIC | Age: 69
End: 2019-06-25

## 2019-06-25 ENCOUNTER — TRANSFERRED RECORDS (OUTPATIENT)
Dept: HEALTH INFORMATION MANAGEMENT | Facility: CLINIC | Age: 69
End: 2019-06-25

## 2019-06-25 DIAGNOSIS — J96.12 CHRONIC RESPIRATORY FAILURE WITH HYPOXIA AND HYPERCAPNIA (H): ICD-10-CM

## 2019-06-25 DIAGNOSIS — D50.9 IRON DEFICIENCY ANEMIA, UNSPECIFIED IRON DEFICIENCY ANEMIA TYPE: ICD-10-CM

## 2019-06-25 DIAGNOSIS — R41.0 CONFUSION: Primary | ICD-10-CM

## 2019-06-25 DIAGNOSIS — I48.0 PAROXYSMAL ATRIAL FIBRILLATION (H): ICD-10-CM

## 2019-06-25 DIAGNOSIS — J96.11 CHRONIC RESPIRATORY FAILURE WITH HYPOXIA AND HYPERCAPNIA (H): ICD-10-CM

## 2019-06-25 DIAGNOSIS — F51.01 PRIMARY INSOMNIA: ICD-10-CM

## 2019-06-25 DIAGNOSIS — Z00.00 HEALTH CARE MAINTENANCE: ICD-10-CM

## 2019-06-25 RX ORDER — TRAZODONE HYDROCHLORIDE 150 MG/1
150 TABLET ORAL AT BEDTIME
Qty: 90 TABLET | Refills: 1 | Status: SHIPPED | OUTPATIENT
Start: 2019-06-25 | End: 2019-09-04

## 2019-06-25 RX ORDER — CARVEDILOL 3.12 MG/1
1.68 TABLET ORAL 2 TIMES DAILY WITH MEALS
Qty: 60 TABLET | Refills: 1 | Status: SHIPPED | OUTPATIENT
Start: 2019-06-25 | End: 2019-08-20

## 2019-06-25 RX ORDER — SODIUM CHLORIDE FOR INHALATION 3 %
3 VIAL, NEBULIZER (ML) INHALATION 2 TIMES DAILY
Qty: 15 ML | Refills: 11 | Status: SHIPPED | OUTPATIENT
Start: 2019-06-25 | End: 2020-01-01

## 2019-06-25 NOTE — PROGRESS NOTES
Subjective: Vlad Gleason is a 68 year old who is seen at home for follow up visit today.  Seen at [address] [place] [address] .    Also present at visit include: RN support staff, Dr. Serafin Thompson  Acute concerns today include:   -Budesonide nebulizers are no longer covered via patients insurance, as are the 7% NaCl nebulizers. Nursing staff is wondering about an alternative plan.  -Phone call from 6/9 for increased anxiety and agitation. Pt and nursing staff report decreased sleep. Pt says his anxiety and mood have worsened, no specific concerns. They have also noticed some increasing confusion in the mornings compared to his baseline.      Chronic and Past Medical Problems include:  Patient Active Problem List   Diagnosis     Chronic respiratory failure with hypoxia and hypercapnia (H)     Obesity hypoventilation syndrome (H)     Dysphagia     Urinary retention     Sacral wound     Major depressive disorder with psychotic features (H)     Paroxysmal atrial fibrillation (H)     Surgical wound, non healing     Primary insomnia       Social History:   Current activities:  Bed bound. On a ventilator. Morbidly obese. BMI 50.  Support services:  Patient lives at group home/hospital with full RN support  Currently living family/friends:  None in the area  PMHX/PSHX/MEDS/ALLERGIES/SHX/FHX reviewed and updated in Epic.   MEDICATION LIST:  Current Outpatient Medications   Medication     acetaminophen (TYLENOL) 32 mg/mL solution     albuterol (2.5 MG/3ML) 0.083% neb solution     ascorbic acid (VITAMIN C) 500 MG tablet     aspirin 81 MG chewable tablet     brimonidine (ALPHAGAN) 0.2 % ophthalmic solution     brimonidine (ALPHAGAN) 0.2 % ophthalmic solution     budesonide (PULMICORT) 0.5 MG/2ML neb solution     carvedilol (COREG) 3.125 MG tablet     diphenhydrAMINE (BENADRYL) 25 MG tablet     Docusate Sodium (DIOCTO) 150 MG/15ML LIQD     EPINEPHrine PF (ADRENALIN) 1 MG/ML injection     ferrous sulfate 220 (44 FE)  MG/5ML ELIX     fluticasone (ARNUITY ELLIPTA) 100 MCG/ACT inhaler     fluticasone (FLONASE) 50 MCG/ACT spray     gabapentin (NEURONTIN) 300 MG capsule     gabapentin (NEURONTIN) 300 MG capsule     ipratropium - albuterol 0.5 mg/2.5 mg/3 mL (DUONEB) 0.5-2.5 (3) MG/3ML neb solution     loratadine (CLARITIN) 10 MG tablet     melatonin 3 MG tablet     multivitamin, therapeutic (THERA-VIT) TABS tablet     nitroGLYcerin (NITROSTAT) 0.4 MG sublingual tablet     olopatadine (PATANOL) 0.1 % ophthalmic solution     omeprazole (PRILOSEC) 2 mg/mL SUSP     order for DME     order for DME     order for DME     order for DME     order for DME     order for DME     order for DME     oxybutynin (DITROPAN XL) 10 MG 24 hr tablet     oxyCODONE (ROXICODONE) 5 MG tablet     polyethylene glycol 3350 POWD     QUEtiapine (SEROQUEL) 100 MG tablet     Silver Nitrate 10 % OINT     terbinafine (LAMISIL AT) 1 % external cream     traZODone (DESYREL) 50 MG tablet     venlafaxine (EFFEXOR) 75 MG tablet     No current facility-administered medications for this visit.      Medications are managed by:  RN at facility  Patient uses a pill box to manage their medications:  No  Patient has questions, concerns, or potential side effects/interactions from their medications:  No  GERIATRIC ROS:    Do you have difficulty getting around, watching TV or reading because of poor eyesight? No   Can you hear normal conversational voice? Yes  Do you use hearing aides? No   Have you unintentionally lost weight in the last 6 months? No   Do you have trouble with control of your bladder? Suprapubic catheter in place, cares per RN staff   Do you have trouble with control of your bowels? Incontinent, cares per RN staff   Have you had any falls in the past year? Bed bound    GENERAL ROS:   General: No unexplained weight gain or loss. +insomnia  Resp: No worsening shortness of breath, cough or hemoptysis.   GI: No worsening constipation, no diarrhea, no vomiting  : No  changes  Skin: Chronic wounds on his sacrum, thigh and abdomen  Extremities:  No pain.    Objective: Most recent weight:  335 two months ago. BMI 50  Gen: Morbidly obese. Well nourished and in NAD   HEENT: Head is atraumatic, decent dentition  CV: RRR - no murmurs, rubs, or gallups,   Pulm: Mild intermittent expiratory wheeze. No increase work of breathing. No rales/rhonchi, good air entry   ABD: Large hiatal hernia in the right mid to lower abdomen. Reducible. No evidence of incarceration or ischemia. Bowel sounds present. Remainder of abdomen exam shows evidence of an obese abdomen with areas of skin break down. Soft. Non tender.  Extrem: no cyanosis, edema or clubbing. Chronic venous stasis changes of legs bilaterally, trace edema, no open sores.    Psych: Endorses increased depression and anxiety  Neuro: Bed bound    Preventative Screening:   Vaccinations reviewed and up to date: Yes - needs PCV, TD and shingles vaccines. Discussed with nursing, will attempt to get PCV 13 from pharmacy to administer.  Code status: Full code  Goals of care: Long discussion was had with patient at prior visit. He would like to remain full code. No palliative care referral.     Assessment/ Plan:    1. Primary insomnia  Worsening agitation and anxiety with increased insomnia, already on trazodone 100 mg, gabapentin, seroquel, and venlafaxine. May represent inadequately treated depression, will increase trazodone dose at this visit and assess response at next visit.   - traZODone (DESYREL) 150 MG tablet; Take 1 tablet (150 mg) by mouth At Bedtime  Dispense: 90 tablet; Refill: 1    2. Confusion  Pt has hx of hypercapnia in the past. Appears appropriate and interactive on exam today. Unable to perform ABGs in house, so will try VBG and CMP to assess for CO2 retention.   - Blood Gases Venous ([facility]); Future  - Comprehensive Metabolic Panel ([place]); Future    3. Iron deficiency anemia, unspecified iron deficiency anemia type  Has  been on iron supplementation, will assess whether he continues to need this.   - Hemoglobin (HGB) ([facility]); Future    4. Paroxysmal atrial fibrillation (H)  HR in 60-80s per RN report, sinus on my exam today. Refilled and reconciled our medication list with his facility's.   - carvedilol (COREG) 3.125 MG tablet; Take 0.5 tablets (1.5625 mg) by mouth 2 times daily (with meals)  Dispense: 60 tablet; Refill: 1    5. Chronic respiratory failure with hypoxia and hypercapnia (H)  Unable to get budesonide nebulizers per insurance. Appears Dr. Mack may be pursuing prior auth for this. Unable to do MDI with trach but could do aerosol. Will ask PharmD if they have any input into alternative ICS recommendations.   - sodium chloride (NEBUSAL) 3 % neb solution; Take 3 mLs by nebulization 2 times daily  Dispense: 15 mL; Refill: 11    6. Health care maintenance  Needs PCV13, Td and zoster vaccine. Not sure how to get these vaccines to him. Discussed with nursing staff, will attempt to get him PCV 13 which can be administered in the house. If successful, can look into getting the other vaccines at future visit. Will send message to care coordinator Lesly to see if she can help order this from pharmacy.      RECOMMENDED FOLLOW UP:  2 months.    Level of Service:  93095    Alley Nick MD, PGY-3  [facility]      The patient was seen by and discussed with Serafin Thompson MD

## 2019-06-27 ENCOUNTER — MEDICAL CORRESPONDENCE (OUTPATIENT)
Dept: HEALTH INFORMATION MANAGEMENT | Facility: CLINIC | Age: 69
End: 2019-06-27

## 2019-07-09 NOTE — TELEPHONE ENCOUNTER
Called by home health nurse today as patient has been having trach site pain. She states that there are no external visible changes. Surrounding trach is mildly pink but not warm, erythematous, or oozing. Vital signs completely normal without tachycardia or fevers. Tylenol is helping. Trach is still functioning normally.  She is not sure how long this has been going on for, but did get in sign out that he has been saying this for at least a few days.     Recommended that she call the clinic tomorrow AM and get him set up for a face to face visit to assess what the next steps would be. She verbalized understanding.     Shital Booth MD  PGY-2, [facility]   Pager: [phone]           Continue home medications

## 2019-07-18 NOTE — PROGRESS NOTES
Nutrition Care Plan    Nutrition Diagnosis:   Inadequate energy intake related to poor appetite secondary to depression as evidenced by patient reporting eating 1 meal every other day and patient reporting 9.1 kg weight loss over the past month (no weight history per chart review).     Intervention:  General/healthful diet:   Continue regular diet, no allergies. Patient reports poor appetite and 20 pound weight loss over the past month due to not eating due to depression. Patient shared that he has been eating one meal every other day. Encouraged adequate intake of fluids and food.   Purpose of the nutrition education:   Discussed consumption of smaller, more frequent meals (and meal consistency) to promote PO intakes. Discussed importance of snacks or supplements between meals. Patient declined scheduled snacks and supplements-would prefer to get them off unit.     Monitoring and Evaluation:  Amount of food:   Goal - patient to consume >75% of 3 meals. Will monitor intakes. Patient reports eating a bowl of cereal for breakfast.   Area(s) and level of knowledge:    Pt verbalized inadequate to basic knowledge before and after education.         Subjective: Vlad Gleason is a 67 year old who is seen at his residency at [address] [place] [address] in [place] [address]  Information collected from patient, his two home nurses  Concerns:  1) Staff and Respiratory therapy concerned about trach needing to be replaced weekly due to thick mucous buildup. Recently finished a course of doxycycline without resolution. RT thinks area is tender  2) Patient reports difficulty with staying asleep at night time. Has melatonin and benadryl available, although no problem with staying asleep.   3) Would like to be re-evaluated for possible sore in right groin area. Feels some discomfort. For his other chronic wounds including left upper thigh and buttocks,wound care is making regular visits  4) Patient severely/morbidly obese and is bedridden. Diet consists of mainly sausage and peppermint york patties  5) Patient continues to want to be Full Code including chest compressions and intubation if needed    Chronic and Past Medical Problems include:  Patient Active Problem List   Diagnosis     Chronic respiratory failure with hypoxia and hypercapnia (H)     Obesity hypoventilation syndrome (H)     Dysphagia     Urinary retention     Sacral wound     Major depressive disorder with psychotic features (H)     Paroxysmal atrial fibrillation (H)     Surgical wound, non healing       Social History:   Current activities: Patient is still at the group home and remains bedbound.  Support services: Patient receives 24-hour care at group home  Currently living family/friends: Family members currently not present at the visitPMHX/PSHX/MEDS/ALLERGIES/SHX/FHX reviewed and updated in Epic.   MEDICATION LIST:  Current Outpatient Prescriptions   Medication     traZODone (DESYREL) 50 MG tablet     acetaminophen (TYLENOL) 32 mg/mL solution     albuterol (2.5 MG/3ML) 0.083% neb solution     ascorbic acid (VITAMIN C) 500 MG tablet     aspirin 81 MG chewable tablet     brimonidine (ALPHAGAN) 0.2 %  ophthalmic solution     budesonide (PULMICORT) 0.5 MG/2ML neb solution     carvedilol (COREG) 3.125 MG tablet     cephALEXin (KEFLEX) 500 MG capsule     diphenhydrAMINE (BENADRYL) 25 MG tablet     Docusate Sodium (DIOCTO) 150 MG/15ML LIQD     EPINEPHrine PF (ADRENALIN) 1 MG/ML injection     ferrous sulfate 220 (44 FE) MG/5ML ELIX     fluticasone (FLONASE) 50 MCG/ACT spray     loratadine (CLARITIN) 10 MG tablet     melatonin 3 MG tablet     multivitamin, therapeutic (THERA-VIT) TABS tablet     nitroGLYcerin (NITROSTAT) 0.4 MG sublingual tablet     olopatadine (PATANOL) 0.1 % ophthalmic solution     omeprazole (PRILOSEC) 2 mg/mL SUSP     order for DME     order for DME     order for DME     oxyCODONE IR (ROXICODONE) 5 MG tablet     polyethylene glycol 3350 POWD     QUEtiapine (SEROQUEL) 100 MG tablet     Silver Nitrate 10 % OINT     venlafaxine (EFFEXOR) 75 MG tablet     No current facility-administered medications for this visit.      Medications are managed by:  SELF, FAMILY, HOME NURSE  Patient uses a pill box to manage their medications:  Nursing staff manages  Patient has questions, concerns, or potential side effects/interactions from their medications:  No  GERIATRIC ROS:    Do you have difficulty getting around, watching TV or reading because of poor eyesight? No   Can you hear normal conversational voice? No   Do you use hearing aides? No   Do you have problems with your memory? No   Have you unintentionally lost weight in the last 6 months? No   Do you have trouble with control of your bladder? Yes   Do you have trouble with control of your bowels? No   Have you had any falls in the past year? NA     GENERAL ROS:   General: No unexplained weight gain or loss; insomnia most nights of week  Resp: No worsening shortness of breath, cough or hemoptysis.   GI: No worsening constipation, no diarrhea, no nausea or vomiting  : no new complaints  Skin: concerns of skin breakdown in right groin/skin fold  area  Extremities:  No pain, extremity weakness or balance troubles    Objective: BP's 140-80's as recorded in logbook, 95% O2 saturation, 75bpm , RR ~20/minute  Gen: severely obese, bedridden, watching television, some eye contact  HEENT: Head is atraumatic, fairdentition  CV: distant heart sounds due to body habitus  Pulm: tracheostomy, no wheezing, slight rhonchi with auscultation closer to trachea  ABD: soft, obese  Psych: flat affect, linear thoughts  Neuro: Pt is able to ambulate independently  SKIN: R groin area/skin fold without erythema or skin breakdown and non-tender to touch    Preventative Screening:   Vaccinations: requested follow-up with nursing staff for records/review  Code status:  FULL  Healthcare Directive, Living Will, POLST has been completed:  Nursing staff has on file     Assessment/ Plan:  1) Insomnia - start trazadone 50 mg PRN. Stop melatonin.   2) Trach complications   I) will try to obtain aerobic bacterial cultures  . Ii) 3% NS nebs recommended by RT, will try this scheduled twice daily to try to help loosen up mucus. Continues on approx 4 L O2 supplementation    RECOMMENDED FOLLOW UP:  2 months.    Level of Service:  90006    Total of 35 minutes was spent in face to face contact with patient with > 50% in counseling and coordination of care.  Options for treatment and/or follow-up care were reviewed with the patient. Vlad Gleason was engaged and actively involved in the decision making process. He verbalized understanding of the options discussed and was satisfied with the final plan.    Carlos Flores MD PGY3  [facility]  [phone] (P)    Patient was precepted with Dr. Jonathan Mack who agrees with the above assessment and plan.     This note may have been created with help of Dragon dictation system. Grammatical /typing errors are not intentional.

## 2019-07-19 ENCOUNTER — MEDICAL CORRESPONDENCE (OUTPATIENT)
Dept: HEALTH INFORMATION MANAGEMENT | Facility: CLINIC | Age: 69
End: 2019-07-19

## 2019-07-19 ENCOUNTER — DOCUMENTATION ONLY (OUTPATIENT)
Dept: FAMILY MEDICINE | Facility: CLINIC | Age: 69
End: 2019-07-19

## 2019-07-19 NOTE — PROGRESS NOTES
To be completed in Nursing note:    Please reference list for forms that require a visit for completion.  Please remind patients that providers are given 3-5 business days to complete and return forms.      Form type:  [facility] Missed Visit Report       Date form received: 2019    Date form completed by Physician:  2019    How was form returned to patient (mailed, faxed, or at  for patient to ):  Faxed    Date form mailed/faxed/left at  for patient and sent to HIM for [email or web address]            Once form is left for patient, faxed, or mailed PCS will then close the documentation only encounter.

## 2019-08-05 ENCOUNTER — TRANSFERRED RECORDS (OUTPATIENT)
Dept: HEALTH INFORMATION MANAGEMENT | Facility: CLINIC | Age: 69
End: 2019-08-05

## 2019-08-05 ENCOUNTER — MEDICAL CORRESPONDENCE (OUTPATIENT)
Dept: HEALTH INFORMATION MANAGEMENT | Facility: CLINIC | Age: 69
End: 2019-08-05

## 2019-08-06 ENCOUNTER — DOCUMENTATION ONLY (OUTPATIENT)
Dept: FAMILY MEDICINE | Facility: CLINIC | Age: 69
End: 2019-08-06

## 2019-08-06 NOTE — PROGRESS NOTES
To be completed in Nursing note:    Please reference list for forms that require a visit for completion.  Please remind patients that providers are given 3-5 business days to complete and return forms.      Form type:  [facility] / Home Health Certification and Plan of Care     Date form received:   2019    Date form completed by Physician:   2019    How was form returned to patient (mailed, faxed, or at  for patient to ):  faxed    Date form mailed/faxed/left at  for patient and sent to HIM for scannin2019      Once form is left for patient, faxed, or mailed PCS will then close the documentation only encounter.

## 2019-08-06 NOTE — PROGRESS NOTES
To be completed in Nursing note:    Please reference list for forms that require a visit for completion.  Please remind patients that providers are given 3-5 business days to complete and return forms.      Form type:  [facility] / Physician Order     Date form received:   2019      Date form completed by Physician: 2019    How was form returned to patient (mailed, faxed, or at  for patient to ):  faxed       Date form mailed/faxed/left at  for patient and sent to HIM for [email or web address]      Once form is left for patient, faxed, or mailed PCS will then close the documentation only encounter.

## 2019-08-10 ENCOUNTER — MEDICAL CORRESPONDENCE (OUTPATIENT)
Dept: HEALTH INFORMATION MANAGEMENT | Facility: CLINIC | Age: 69
End: 2019-08-10

## 2019-08-13 ENCOUNTER — MEDICAL CORRESPONDENCE (OUTPATIENT)
Dept: HEALTH INFORMATION MANAGEMENT | Facility: CLINIC | Age: 69
End: 2019-08-13

## 2019-08-14 ENCOUNTER — TELEPHONE (OUTPATIENT)
Dept: FAMILY MEDICINE | Facility: CLINIC | Age: 69
End: 2019-08-14

## 2019-08-14 DIAGNOSIS — F51.01 PRIMARY INSOMNIA: Primary | ICD-10-CM

## 2019-08-14 NOTE — TELEPHONE ENCOUNTER
[facility] phone call message- patient requesting a refill:    Full Medication Name: BENADRYL     Dose: 25 MG     Pharmacy confirmed as   [facility] - [place], [place] - [address] [facility]  [address] [facility] [address]  Phone: [phone] Fax: [phone]    [facility] - [place], [place] - [address]  [address]  [facility] [address]  Phone: [phone] Fax: [phone]  : Yes    Additional Comments: THE PT IS HAVING  A LOT OF ANXIETY AND THE HOME CAR PROVIDER IS ASKING FOR THE REFILL      OK to leave a message on voice mail? Yes    Primary language: English      needed? No    Call taken on August 14, 2019 at 11:46 AM by Pierre Dukes

## 2019-08-14 NOTE — TELEPHONE ENCOUNTER
Received call from Yamile at [facility] regarding medication clarification. Pat seen at [facility] last night due to Afib with RVR.        Carvedilol increased to 6.25 mg BID  Tylenol dose unchanged  Brimonidine eye drops not covered by insurance  Budesonide not covered by insurance  Ferrous Sulfate 5ml in am added  Gabapentin per home care is 300mg cap.in am  Two 300mg tablets at hs instead of 300mg caps TID   DUONEB changed to add mucomyst  Trazodone decreased to 100mg at hs  Enemeez prn not included     notified that medication clarification was needed.  will call [facility] to review medications.    Note routed to Dr.Berg Dugan RN

## 2019-08-14 NOTE — TELEPHONE ENCOUNTER
[facility] phone call message- general phone call:    Reason for call: The home care nurse called to ask to clarify the orders for all the pt's medications      Return call needed: Yes    OK to leave a message on voice mail? Yes    Primary language: English      needed? No    Call taken on August 14, 2019 at 8:46 AM by Pierre Dukes

## 2019-08-14 NOTE — TELEPHONE ENCOUNTER
I called Vlad's care team at [facility] where he resides and reviewed each medication clarification in detail.  Verbal orders were given.  Order sheet for this will be faxed to [facility] for me to sign and return. An image of this will be scanned into the EMR after I fax it back.    Jonathan Mack MD

## 2019-08-15 ENCOUNTER — TELEPHONE (OUTPATIENT)
Dept: FAMILY MEDICINE | Facility: CLINIC | Age: 69
End: 2019-08-15

## 2019-08-15 NOTE — TELEPHONE ENCOUNTER
Perry calling for med. Clarification  Regarding coreg.    Coreg 3.125mg  Bid  Clarified new dose    Note routed to Dr. Frederick Dugan RN

## 2019-08-15 NOTE — TELEPHONE ENCOUNTER
Perry LECHUGA reporting patient's HR 59 this AM reporting coreg has been held until  Orders received from . New dose of coreg 6.25mg bid started yesterday. Pt had low HR of 48 last evening.     LPN also requesting benadryl to be reinstated  Due to elevated anxiety     [phone]     Routed to Dr.Berg Kailee ANDERSON

## 2019-08-15 NOTE — TELEPHONE ENCOUNTER
[facility] phone call message- general phone call:    Reason for call: the home care nurse called back to give the medication information he was asked to give and symptoms      Return call needed: Yes    OK to leave a message on voice mail? Yes    Primary language: English      needed? No    Call taken on August 15, 2019 at 9:49 AM by Pierre Dukes

## 2019-08-16 ENCOUNTER — DOCUMENTATION ONLY (OUTPATIENT)
Dept: FAMILY MEDICINE | Facility: CLINIC | Age: 69
End: 2019-08-16

## 2019-08-16 RX ORDER — DIPHENHYDRAMINE HCL 25 MG
25 TABLET ORAL 4 TIMES DAILY PRN
Qty: 30 TABLET | Refills: 3 | Status: SHIPPED | OUTPATIENT
Start: 2019-08-16 | End: 2020-01-01

## 2019-08-16 NOTE — PROGRESS NOTES
To be completed in Nursing note:    Please reference list for forms that require a visit for completion.  Please remind patients that providers are given 3-5 business days to complete and return forms.      Form type:  [facility] / Physician Order       Date form received:   08/15/2019     Date form completed by Physician:  2019      How was form returned to patient (mailed, faxed, or at  for patient to ):  Faxed      Date form mailed/faxed/left at  for patient and sent to HIM for [email or web address]          Once form is left for patient, faxed, or mailed PCS will then close the documentation only encounter.

## 2019-08-20 ENCOUNTER — TRANSFERRED RECORDS (OUTPATIENT)
Dept: HEALTH INFORMATION MANAGEMENT | Facility: CLINIC | Age: 69
End: 2019-08-20

## 2019-08-20 ENCOUNTER — DOCUMENTATION ONLY (OUTPATIENT)
Dept: FAMILY MEDICINE | Facility: CLINIC | Age: 69
End: 2019-08-20

## 2019-08-20 DIAGNOSIS — F41.9 ANXIETY: ICD-10-CM

## 2019-08-20 DIAGNOSIS — R68.81 EARLY SATIETY: ICD-10-CM

## 2019-08-20 DIAGNOSIS — E11.8 TYPE 2 DIABETES MELLITUS WITH COMPLICATION, WITHOUT LONG-TERM CURRENT USE OF INSULIN (H): ICD-10-CM

## 2019-08-20 DIAGNOSIS — I48.0 PAROXYSMAL ATRIAL FIBRILLATION (H): Primary | ICD-10-CM

## 2019-08-20 RX ORDER — CARVEDILOL 3.12 MG/1
3.12 TABLET ORAL 2 TIMES DAILY WITH MEALS
Qty: 60 TABLET | Refills: 1 | Status: SHIPPED | OUTPATIENT
Start: 2019-08-20

## 2019-08-20 NOTE — PROGRESS NOTES
Preceptor Attestation:   Patient seen, evaluated and discussed with the resident. I have verified the content of the note, which accurately reflects my assessment of the patient and the plan of care.   Supervising Physician:  Jonathan Mack MD MD

## 2019-08-20 NOTE — PROGRESS NOTES
Subjective: Vlad Gleason is a 68 year old who is seen at home for follow up visit today.  Seen at [address] [place] [address] .    Also present at visit include nursing staff at the home.  Acute concerns today include:   1. Patient was hospitalized at [facility] on 8/13 for a fib with RVR. He was discharged on an increased dose of carvedilol. Since then, Coreg has been held 3 consecutive nights for HR <60. He denies any palpitations or chest pain. Occasionally, he feels dizzy. No light headedness. He is bed bound.   2. Hyperglycemia - While at [facility], an A1c was checked that came back at 8.4. RN staff at his home report he eats a poor diet including coke and pasta. He is willing to switch to diet soda. No prior history of diabetes. A fasting BG was checked this morning with result pending.  3. Sacral ulcer is improving with ongoing wound cares.   4. Anxiety - Reports feelings of anxiety are unchanged. They are manageable. Trazodone was increased at last visit.  5. He repeats feeling mendez sooner than he has in the past. His weight in the ER was 351 lb. His weight in December 2018 was 330 lb. He does not report heartburn or abdominal pain.   6. Code status - He would like to continue to be admitted for acute issues at this point but will consider code status ongoing. If he changes his mind, he will inform care staff.     Chronic and Past Medical Problems include:  Patient Active Problem List   Diagnosis     Chronic respiratory failure with hypoxia and hypercapnia (H)     Obesity hypoventilation syndrome (H)     Dysphagia     Urinary retention     Sacral wound     Major depressive disorder with psychotic features (H)     Paroxysmal atrial fibrillation (H)     Surgical wound, non healing     Primary insomnia     Social History:   Current activities: Primarily bed bound. Trach/vent dependent and morbidly obese.  Support services:  Home care staff, wound care  Currently living family/friends:  Lives in a group  home/care center with full RN support.  PMHX/PSHX/MEDS/ALLERGIES/SHX/FHX reviewed and updated in Epic.   MEDICATION LIST:  Current Outpatient Medications   Medication     acetaminophen (TYLENOL) 32 mg/mL solution     albuterol (2.5 MG/3ML) 0.083% neb solution     ascorbic acid (VITAMIN C) 500 MG tablet     aspirin 81 MG chewable tablet     brimonidine (ALPHAGAN) 0.2 % ophthalmic solution     carvedilol (COREG) 3.125 MG tablet     diphenhydrAMINE (BENADRYL) 25 MG tablet     diphenhydrAMINE (BENADRYL) 25 MG tablet     Docusate Sodium (DIOCTO) 150 MG/15ML LIQD     EPINEPHrine PF (ADRENALIN) 1 MG/ML injection     ferrous sulfate 220 (44 FE) MG/5ML ELIX     fluticasone (FLONASE) 50 MCG/ACT spray     gabapentin (NEURONTIN) 300 MG capsule     gabapentin (NEURONTIN) 300 MG capsule     ipratropium - albuterol 0.5 mg/2.5 mg/3 mL (DUONEB) 0.5-2.5 (3) MG/3ML neb solution     loratadine (CLARITIN) 10 MG tablet     melatonin 3 MG tablet     multivitamin, therapeutic (THERA-VIT) TABS tablet     nitroGLYcerin (NITROSTAT) 0.4 MG sublingual tablet     olopatadine (PATANOL) 0.1 % ophthalmic solution     omeprazole (PRILOSEC) 2 mg/mL SUSP     order for DME     order for DME     order for DME     order for DME     order for DME     order for DME     order for DME     oxybutynin (DITROPAN XL) 10 MG 24 hr tablet     polyethylene glycol 3350 POWD     QUEtiapine (SEROQUEL) 100 MG tablet     Silver Nitrate 10 % OINT     sodium chloride (NEBUSAL) 3 % neb solution     terbinafine (LAMISIL AT) 1 % external cream     traZODone (DESYREL) 150 MG tablet     venlafaxine (EFFEXOR) 75 MG tablet     No current facility-administered medications for this visit.      Medications are managed by:  RN staff   Patient uses a pill box to manage their medications:  No  Patient has questions, concerns, or potential side effects/interactions from their medications:  No  GERIATRIC ROS:    Do you have difficulty getting around, watching TV or reading because of  poor eyesight? Patient complains of intermittently blurred vision   Can you hear normal conversational voice? Yes   Do you use hearing aides? No   Do you have problems with your memory?   Do you often feel sad or depressed? Reports ongoing intermittent anxiety  Have you unintentionally lost weight in the last 6 months? No   Do you have trouble with control of your bladder? No, suprapubic catheter in place  Do you have trouble with control of your bowels? Incontinent, cares per RN staff   Have you had any falls in the past year? No    GENERAL ROS:   General: No unexplained weight gain or loss; +occasional insomnia  Resp: No worsening shortness of breath, cough or hemoptysis.   GI: no nausea or vomiting  : Voiding with suprapubic catheter  Skin: No areas of new skin breakdown or worrisome rashes  Extremities:  Normal sensation, no pain    Objective: There were no vitals taken for this visit.   Most recent weight:  351 lb on 8/13/19  Gen: Obese, bed bound, vent/trach dependent    HEENT: Head is atraumatic, EOMI, PERRL  CV: RRR - no murmurs, rubs, or gallups, heart sounds distant  Pulm: diminished BS, no wheezes/rales/rhonchi, normal work of breathing  ABD: non distended, soft, non tender  Extrem: atrophic, bilateral non pitting edema, chronic venous stasis dermatitis, normal sensation dorsal feet   Psych: Euthymic, normal affect  Neuro: moves both upper extremities, bed bound    Preventative Screening:   Vaccinations reviewed and up to date Due for PCV, Td and Shingles.   Get up and Go test:  Not Applicable.      Code status: Full  Healthcare Directive, Living Will, POLST has been completed:  Discussed with patient at bedside and he will consider code status but for now is full code.      Assessment/ Plan:  1. Paroxysmal atrial fibrillation (H)  Seen at [facility] ER for a fib with RVR. Became bradycardic with discharge dose of 6.2 mg bid. Dosage readjusted to 3.125 mg bid. Hold for HR < 60 bpm. Plan to check HR before  every dose.   - carvedilol (COREG) 3.125 MG tablet; Take 1 tablet (3.125 mg) by mouth 2 times daily (with meals) Hold for HR <60 bpm.  Dispense: 60 tablet; Refill: 1    2. Type 2 diabetes mellitus with complication, without long-term current use of insulin (H)  A1c was 8.4% at [facility] without prior diagnosis. Will start metformin 500 mg bid and check fasting BGs for the next two weeks. Fax results to [facility]. May increase metformin as tolerated. He is already on ASA. Would benefit from addition of statin in future if tolerated.   - metFORMIN (GLUCOPHAGE) 500 MG tablet; Take 1 tablet (500 mg) by mouth 2 times daily (with meals)  Dispense: 120 tablet; Refill: 1  - blood glucose monitoring (NO BRAND SPECIFIED) meter device kit; Use to test blood sugar 1 times daily or as directed.  Dispense: 1 kit; Refill: 0  - blood glucose (NO BRAND SPECIFIED) test strip; Use to test blood sugar 1 times daily or as directed.  Dispense: 100 each; Refill: 1  - blood glucose (NO BRAND SPECIFIED) lancets standard; Use to test blood sugar 1 times daily or as directed.  Dispense: 100 each; Refill: 3  - Glycosylated Hgb A1C ([employer]); Future    3. Anxiety  Reports ongoing chronic anxiety that is not changed and does not significantly interfere with his life. Staff reports he occasionally wakes up during the night with flashbacks. May have component of PTSD. He is already on trazodone, Seroquel, Effexor, benadryl prn and gabapentin. If necessary, could go up on Seroquel in future or consider alternative agent.     4. Early satiety  Reports feeling full earlier than previously. No weight change in at least the last 6 months and no reports of abdominal pain or heart burn. Continue to monitor and hopefully recheck weight in a few months.     RECOMMENDED FOLLOW UP:  2 months.  Level of Service:  79918  Total of 40 minutes was spent in face to face contact with patient with > 50% in counseling and coordination of care.  Options for  treatment and/or follow-up care were reviewed with the patient. Vlad SHWETHA Victorino was engaged and actively involved in the decision making process. He verbalized understanding of the options discussed and was satisfied with the final plan.      Sheri Perez MD PGY-3    Discussed with Dr. Mack, precepting physician.

## 2019-08-22 ENCOUNTER — TELEPHONE (OUTPATIENT)
Dept: FAMILY MEDICINE | Facility: CLINIC | Age: 69
End: 2019-08-22

## 2019-08-22 ENCOUNTER — DOCUMENTATION ONLY (OUTPATIENT)
Dept: FAMILY MEDICINE | Facility: CLINIC | Age: 69
End: 2019-08-22

## 2019-08-22 NOTE — PROGRESS NOTES
To be completed in Nursing note:    Please reference list for forms that require a visit for completion.  Please remind patients that providers are given 3-5 business days to complete and return forms.      Form type:  [employer] / [employer]      Date form received:  2019    Date form completed by Physician:  2019    How was form returned to patient (mailed, faxed, or at  for patient to ):  Faxed     Date form mailed/faxed/left at  for patient and sent to HIM for scannin2019       Once form is left for patient, faxed, or mailed PCS will then close the documentation only encounter.

## 2019-08-23 ENCOUNTER — TELEPHONE (OUTPATIENT)
Dept: FAMILY MEDICINE | Facility: CLINIC | Age: 69
End: 2019-08-23

## 2019-08-23 NOTE — TELEPHONE ENCOUNTER
[facility] phone call message- patient requesting a refill:    Full Medication Name: all DM supplies fax to [employer].     Dose:     Pharmacy confirmed as   [facility] - [place], [place] - [address] [facility]  [address] [facility] [address]  Phone: [phone] Fax: [phone]    [facility] [place], [place] - [address]  [address]  [facility] [address]  Phone: [phone] Fax: [phone]  : No:     Additional Comments: Would like new [facility] for all DM supplies only  faxed to [facility] at [address] [facility] . Fax [phone]. Phone # [phone]    OK to leave a message on voice mail? Yes    Primary language: English      needed? No    Call taken on August 23, 2019 at 12:27 PM by Anaid Bautista

## 2019-08-26 ENCOUNTER — DOCUMENTATION ONLY (OUTPATIENT)
Dept: FAMILY MEDICINE | Facility: CLINIC | Age: 69
End: 2019-08-26

## 2019-08-26 NOTE — PROGRESS NOTES
To be completed in Nursing note:    Please reference list for forms that require a visit for completion.  Please remind patients that providers are given 3-5 business days to complete and return forms.      Form type:  [facility] / Physician's Orders     Date form received:   2019     Date form completed by Physician:  2019     How was form returned to patient (mailed, faxed, or at  for patient to ): faxed     Date form mailed/faxed/left at  for patient and sent to HIM for scannin2019       Once form is left for patient, faxed, or mailed PCS will then close the documentation only encounter.

## 2019-08-27 ENCOUNTER — MEDICAL CORRESPONDENCE (OUTPATIENT)
Dept: HEALTH INFORMATION MANAGEMENT | Facility: CLINIC | Age: 69
End: 2019-08-27

## 2019-08-28 ENCOUNTER — TELEPHONE (OUTPATIENT)
Dept: FAMILY MEDICINE | Facility: CLINIC | Age: 69
End: 2019-08-28

## 2019-08-28 DIAGNOSIS — J44.9 CHRONIC OBSTRUCTIVE PULMONARY DISEASE, UNSPECIFIED COPD TYPE (H): Primary | ICD-10-CM

## 2019-08-28 NOTE — TELEPHONE ENCOUNTER
[facility] phone call message- patient requesting a refill:    Full Medication Name: Arnuity Elpt/Fluticasone    Dose: 100 MCG/inhail one puff into the lungs daily    Pharmacy confirmed as   [facility] - [place], [place] - [address]  [facility] [address]  Phone: [phone] Fax: [phone]  : Yes    Additional Comments: I was unable to find this on the patients list.    OK to leave a message on voice mail? Yes    Primary language: English      needed? No    Call taken on August 28, 2019 at 9:08 AM by Jocy Borges

## 2019-09-03 ENCOUNTER — TELEPHONE (OUTPATIENT)
Dept: FAMILY MEDICINE | Facility: CLINIC | Age: 69
End: 2019-09-03

## 2019-09-03 DIAGNOSIS — R45.1 AGITATION: ICD-10-CM

## 2019-09-03 DIAGNOSIS — B37.9 CANDIDA INFECTION: Primary | ICD-10-CM

## 2019-09-03 DIAGNOSIS — F51.01 PRIMARY INSOMNIA: ICD-10-CM

## 2019-09-03 NOTE — TELEPHONE ENCOUNTER
[facility] phone call message- general phone call:    Reason for call: Nurse is calling to continue orders for the gabapentin tablets 600mg in the evening and 300mg tablet in the am. Nystatin use every day up to 3 times a day or as needed.     Return call needed: No    OK to leave a message on voice mail? No    Primary language: English      needed? No    Call taken on September 3, 2019 at 1:27 PM by Grisel Flores-Cardona

## 2019-09-03 NOTE — TELEPHONE ENCOUNTER
[facility] requesting to continue gabapentin 600mg q pm and 300mg q am for leg pain.and to continue nystatin daily and tid prn for skin irritation under folds    Nurse states these are not new meds they were discontinued for unknown reasons.    Kailee ANDERSON

## 2019-09-04 RX ORDER — TRAZODONE HYDROCHLORIDE 150 MG/1
150 TABLET ORAL AT BEDTIME
Qty: 90 TABLET | Refills: 3 | Status: SHIPPED | OUTPATIENT
Start: 2019-09-04

## 2019-09-04 RX ORDER — GABAPENTIN 300 MG/1
300 CAPSULE ORAL 3 TIMES DAILY
Qty: 90 CAPSULE | Refills: 3 | Status: SHIPPED | OUTPATIENT
Start: 2019-09-04 | End: 2019-09-25

## 2019-09-04 RX ORDER — NYSTATIN 100000 U/G
CREAM TOPICAL DAILY
Qty: 30 G | Refills: 11 | Status: SHIPPED | OUTPATIENT
Start: 2019-09-04

## 2019-09-05 ENCOUNTER — DOCUMENTATION ONLY (OUTPATIENT)
Dept: FAMILY MEDICINE | Facility: CLINIC | Age: 69
End: 2019-09-05

## 2019-09-05 ENCOUNTER — MEDICAL CORRESPONDENCE (OUTPATIENT)
Dept: HEALTH INFORMATION MANAGEMENT | Facility: CLINIC | Age: 69
End: 2019-09-05

## 2019-09-05 NOTE — PROGRESS NOTES
To be completed in Nursing note:    Please reference list for forms that require a visit for completion.  Please remind patients that providers are given 3-5 business days to complete and return forms.      Form type:  [facility], [facility] / Physician Order      Date form received:  2019    Date form completed by Physician:  2019    How was form returned to patient (mailed, faxed, or at  for patient to ):  faxed    Date form mailed/faxed/left at  for patient and sent to HIM for scannin2019       Once form is left for patient, faxed, or mailed PCS will then close the documentation only encounter.

## 2019-09-09 ENCOUNTER — DOCUMENTATION ONLY (OUTPATIENT)
Dept: FAMILY MEDICINE | Facility: CLINIC | Age: 69
End: 2019-09-09

## 2019-09-09 ENCOUNTER — TELEPHONE (OUTPATIENT)
Dept: FAMILY MEDICINE | Facility: CLINIC | Age: 69
End: 2019-09-09

## 2019-09-09 NOTE — PROGRESS NOTES
To be completed in Nursing note:    Please reference list for forms that require a visit for completion.  Please remind patients that providers are given 3-5 business days to complete and return forms.      Form type:  [employer] [facility], [facility] / Physician Order         Date form received:  2019    Date form completed by Physician:  09/10/2019    How was form returned to patient (mailed, faxed, or at  for patient to ):  Faxed     Date form mailed/faxed/left at  for patient and sent to HIM for scannin/10/2019        Once form is left for patient, faxed, or mailed PCS will then close the documentation only encounter.

## 2019-09-09 NOTE — TELEPHONE ENCOUNTER
[facility] phone call message- medication clarification/question:    Full Medication Name:     Nystatin Powder    Question:     RN Miguelina is calling to request powder for pt.      Pharmacy confirmed as    [facility] - [place], [place] - [address] [place] [address] #2 - [place], [place] - [address]: Yes    OK to leave a message on voice mail? Yes    Primary language: English      needed? No    Call taken on September 9, 2019 at 9:23 AM by Steffanie Long CMA

## 2019-09-10 ENCOUNTER — MEDICAL CORRESPONDENCE (OUTPATIENT)
Dept: HEALTH INFORMATION MANAGEMENT | Facility: CLINIC | Age: 69
End: 2019-09-10

## 2019-09-10 DIAGNOSIS — B37.9 CANDIDA INFECTION: Primary | ICD-10-CM

## 2019-09-10 RX ORDER — NYSTATIN 100000 [USP'U]/G
POWDER TOPICAL 2 TIMES DAILY PRN
Qty: 60 G | Refills: 11 | Status: SHIPPED | OUTPATIENT
Start: 2019-09-10

## 2019-09-11 ENCOUNTER — DOCUMENTATION ONLY (OUTPATIENT)
Dept: FAMILY MEDICINE | Facility: CLINIC | Age: 69
End: 2019-09-11

## 2019-09-11 NOTE — PROGRESS NOTES
To be completed in Nursing note:    Please reference list for forms that require a visit for completion.  Please remind patients that providers are given 3-5 business days to complete and return forms.      Form type:  [facility] / Diabetic Detailed Written Order        Date form received:  2019    Date form completed by Physician:  2019    How was form returned to patient (mailed, faxed, or at  for patient to ): faxed    Date form mailed/faxed/left at  for patient and sent to HIM for scannin2019      Once form is left for patient, faxed, or mailed PCS will then close the documentation only encounter.

## 2019-09-12 ENCOUNTER — DOCUMENTATION ONLY (OUTPATIENT)
Dept: FAMILY MEDICINE | Facility: CLINIC | Age: 69
End: 2019-09-12

## 2019-09-12 ENCOUNTER — MEDICAL CORRESPONDENCE (OUTPATIENT)
Dept: HEALTH INFORMATION MANAGEMENT | Facility: CLINIC | Age: 69
End: 2019-09-12

## 2019-09-12 NOTE — PROGRESS NOTES
To be completed in Nursing note:    Please reference list for forms that require a visit for completion.  Please remind patients that providers are given 3-5 business days to complete and return forms.      Form type:  [facility], [facility] / Physician Order    Date form received:  2019    Date form completed by Physician:  2019    How was form returned to patient (mailed, faxed, or at  for patient to ): faxed     Date form mailed/faxed/left at  for patient and sent to HIM for scannin2019       Once form is left for patient, faxed, or mailed PCS will then close the documentation only encounter.

## 2019-09-24 ENCOUNTER — TELEPHONE (OUTPATIENT)
Dept: FAMILY MEDICINE | Facility: CLINIC | Age: 69
End: 2019-09-24

## 2019-09-24 DIAGNOSIS — R45.1 AGITATION: ICD-10-CM

## 2019-09-24 NOTE — TELEPHONE ENCOUNTER
[facility] requesting clarification on two orders:    The pt used to received gabapentin 300mg in the morning and 600mg in the evening. The refill placed 9/4/19 was ordered as 300mg TID. She is wondering if this is a mistake or if the provider would like to switch to this administration.    [facility] has been checking blood sugars daily in the morning x2 weeks. They are wondering if this should continue indefinitely or if they are to stop as the two weeks is up.     Note routed to Dr. Mack for clarification. ./LR

## 2019-09-25 ENCOUNTER — DOCUMENTATION ONLY (OUTPATIENT)
Dept: FAMILY MEDICINE | Facility: CLINIC | Age: 69
End: 2019-09-25

## 2019-09-25 ENCOUNTER — MEDICAL CORRESPONDENCE (OUTPATIENT)
Dept: HEALTH INFORMATION MANAGEMENT | Facility: CLINIC | Age: 69
End: 2019-09-25

## 2019-09-25 RX ORDER — GABAPENTIN 300 MG/1
CAPSULE ORAL
Qty: 90 CAPSULE | Refills: 3 | Status: SHIPPED | OUTPATIENT
Start: 2019-09-25

## 2019-09-27 ENCOUNTER — DOCUMENTATION ONLY (OUTPATIENT)
Dept: FAMILY MEDICINE | Facility: CLINIC | Age: 69
End: 2019-09-27

## 2019-09-27 ENCOUNTER — MEDICAL CORRESPONDENCE (OUTPATIENT)
Dept: HEALTH INFORMATION MANAGEMENT | Facility: CLINIC | Age: 69
End: 2019-09-27

## 2019-09-27 NOTE — PROGRESS NOTES
/To be completed in Nursing note:    Please reference list for forms that require a visit for completion.  Please remind patients that providers are given 3-5 business days to complete and return forms.      Form type:[facility] physicians orders    Date form received: 09/26/19    Date form completed by Physician:09/27/19    How was form returned to patient (mailed, faxed, or at  for patient to ): fax to [phone]    Date form mailed/faxed/left at  for patient and sent to HIM for scanning:faxed 09/27/19      Once form is left for patient, faxed, or mailed PCS will then close the documentation only encounter.

## 2019-09-27 NOTE — PROGRESS NOTES
Patient:   KHARI SINGH            MRN: [id number]            FIN: [id number]               Age:   40 years     Sex:  FEMALE     :  73   Associated Diagnoses:   None   Author:   JUSTIN OVIEDO      History of Present Illness             The patient presents with     Reason for ICU Admission:   s/p cardiopulomary arrest     History of Present Illness:  Patient is a 40-year-old female with history of heroin abuse presenting following respiratory arrest.  Per notes patient was at a friend's house when she excused herself to go to the bathroom.  In the bathroom they believe patient snorted heroin.   She exited the bathroom, vomited and then became unresponsive.  Her friend saw her collapse and called EMS at . EMS instructed caller to initiate CPR, which he apparently did. EMS arrived at 5 and began to resucitate patient w/ CPR, epi x 3, atropine x 1, and a single shock for v fib that became asystole.     Once in  ED, she was intubated for airway protection and in the process was vomiting copious amounts requiring stomach decompression.  Vitals included: T 33.5, , RR 24, O2 sat 97, /87.    Labs showed Na 146, K5.3, CO2 19, creatinine 1.67.  Lactic acid was 13.5.  INR 1.3.  Troponin 0.04.  CBC showed WBC 32.8, Hgb 11.7.  ABG showed pH 6.78/120/136/21.    Tox screen positive for opiates.    Per ER, CT abdomen pelvis, CT head, CT Cervical spine were negative for acute process.  CT PE was negative for PE but did show extensive bilateral consolidations consistent with aspiration.  EKG showed showed atrial fibrillation with diffuse ST depressions in anterolateral leads.  Repeat EKG showed improvement and sinus rhythm.      Patient received epi x2, calcium chloride x1, narcan w/ ongoing CPR. After 2 rounds of ACLS, patient had strong pulses w/ SBP in 150s.   She received 2L NS, Zosyn, Vanc, and albuterol.  She was sedated with fentanyl and versed.  She was started on  Seen 6/25/19 face to face.   Due to chronic skin wounds and morbid obesity, needs a group 2 mattress.    Jonathan Mack MD   levophed for hypotension.  A central line was placed in the ED.  Pt was saturating at 70% with PEEP of 22, FiO2 100%.  Cardiology was consulted but did not believe the source for arrest was cardiogenic.  Bedside ECHO showed a normal functioning heart.    In the MICU, patient was intubated and sedated, non responsive. Ventilator settings were adjusted to maintain O2 sats >87. Patient was started on phenylephrine 100 in addition to levophed 30. Paralytic Cisatracurium was added. Sedation was continued w/ fentanyl and midazolam. 3 amps bicarb were given and a bicarb drip was started.     Past Medical History:  Asthma  Heroin abuse    Surgical History:  Unable to obtain    Social History:  Lives w/ roomate  Estranged from family  Illicit drug abuse. Heroin and cocaine abuse.`    Family History:  Unable to obtain    Medications:   Unable to obtain    Allergies:   NKA    CODE STATUS: FULL CODE, nondecisional. Mother is surrogate. Mother's phone number: [phone]   .        Review of Systems   Constitutional   Eye   Ear/Nose/Mouth/Throat   Cardiovascular   Respiratory   Gastrointestinal   Genitourinary   Musculoskeletal   All other systems Unable to obtain (due to clinical condition, due to altered mental status).     Histories   Past Med History: Past Medical History   Asthma     Family History:    No family history items have been selected or recorded.   Procedure History:    No active procedure history items have been selected or recorded.   Social History        No qualifying data available.  .        Health Status   Allergies:    Allergic Reactions (All)  NKA   Current medications:  (Selected)   Inpatient Medications  Ordered  CISatracurium (Nimbex).: 12.465 mg = 6.23 mL, Slow IV Push, Once (scheduled), 01/05/18 5:28:00, STAT, Stop: 01/05/18 5:28:00, 6.23 mL TOTAL Volume, RATE: 0 mL/hr, Infuse over 0 hr, Injection  CISatracurium 200 mg [3 mcg/kg/min] + Dextrose 5% 100 mL: 100 mL, IV, TITRATE to MAINTAIN, MAX DOSE  10 mcg/kg/min, 01/05/18 5:28:00, STAT, 100 mL TOTAL Volume, RATE: 7.48 mL/hr, Infuse over 13.4 hr, Start infusion at 3 mcg/kg/min.   TITRATE to MAINTAIN TOF of 1-2 per titration table in the NMBA protocol, up...  D5W 1,000 mL + sodium bicarbonate (Na Bicarb) IV additive 150 mEq: 1,000 mL, IV, 01/05/18 5:47:00, STAT, 1,000 mL TOTAL Volume, RATE: 80 mL/hr, Infuse over 12.5 hr, IV Soln, Weight: 83.1 kg  Dextrose 5% - 0.9% NaCl 1,000 mL: 1,000 mL, IV, 01/05/18 5:18:00, Routine, 1,000 mL TOTAL Volume, RATE: 20 mL/hr, Infuse over 50 hr, IV Soln, Begin if patient receiving continuous insulin infusion AND blood glucose is less than 200 mg/dL, Weight: 83.1 kg  DuoNeb inhalation 2.5-0.5 mg/3 mL solution: 3 mL, Nebulizer, Q4H, 01/05/18 9:00:00, Routine, Inhalation  NORepinephrine 8 mg [1 mcg/min] + .premixed in Sodium Chloride 0.9% 250 mL: 250 mL, IV, TITRATE to MAINTAIN, 01/05/18 5:47:00, Routine, 250 mL TOTAL Volume, RATE: 1.88 mL/hr, Infuse over 133 hr, Weight: 83.1 kg  chlorhexidine topical ORAL 0.12% rinse (Peridex).: 15 mL, Oral Mucosa, Q12H, 01/05/18 9:00:00, Routine, Liquid, Swab oral mucosa while on ventilator.  dextrose (glucose) injection 50%.: 12.5 gm = 25 mL, IV Push, As Directed PRN, PRN hypoglycemia, 01/05/18 5:18:00, Routine, Injection  dextrose (glucose) oral.: 15 gm, Oral, As Directed PRN, PRN hypoglycemia, 01/05/18 5:18:00, Routine, Gel  fentaNYL (Sublimaze).: 25 mcg = 0.5 mL, IV Push, Q15 Minutes, PRN Other (see order comments), 01/05/18 5:49:00, Routine, Injection, PRN for BPS score greater than or equal to 5 or RASS score of greater than or equal to +1.  fentaNYL 2,500 mcg [25 mcg/hr] + .premixed in Sodium Chloride 0.9% 250 mL: 250 mL, IV, TITRATE to MAINTAIN, Begin at 25 mcg/hr, then titrate by 25 mcg/hr every 30 minutes until goal sedation is achieved and BPS score is less than 5. Bolus with PRN fentanyl prior to each drip rate increase. No absolute maximum dose., 01/05/18...  glucagon (GlucaGen).: 1 mg  = 1 mL, IM, As Directed PRN, PRN hypoglycemia, 01/05/18 5:18:00, Routine, Injection  insulin regular (HumuLIN R / NovoLIN R) sliding scale 100 unit/mL.: 2-7 units, IV Push, As Directed PRN, PRN hyperglycemia, 01/05/18 5:18:00, Routine, Injection, Dosing per ICU protocol.  insulin regular 100 unit [1 unit/hr] + Sodium Chloride 0.9% 100 mL: 100 mL, IV, TITRATE to MAINTAIN, Titrate per ICU protocol, 01/05/18 5:18:00, Routine, 100 mL TOTAL Volume, RATE: 1 mL/hr, Infuse over 100 hr, Titrate per ICU protocol., Weight: 83.1 kg  midazolam (Versed).: 2 mg = 2 mL, Slow IV Push, Q10 Minutes, PRN Other (see order comments), 01/05/18 5:49:00, Routine, Injection, PRN for RASS score greater than or equal to +1  midazolam 100 mg [2 mg/hr] + .premixed in Sodium Chloride 0.9% 100 mL: 100 mL, IV, TITRATE to MAINTAIN, Begin at 2 mg/hr, then titrate by 1-2 mg/hr every hour until goal sedation is achieved. Bolus with PRN midazolam prior to each drip rate increase. No absolute maximum dose., 01/05/18 5:49:00, Routine, 100 mL TOTAL Volu...  mineral oil-petrolatum ophthalmic (Lacri-Lube SOP).: 1 application, Each Conj Sac, As Directed PRN, PRN dry eyes, 01/05/18 5:28:00, Routine, Ophth Oint  ocular lubricant solution (Artificial Tears).: 2 drop, Each Eye, Q2H, PRN dry eyes, 01/05/18 5:49:00, Routine, Ophth Soln  ocular lubricant solution (Artificial Tears).: 2 drop, Each Eye, Q4H, 01/05/18 9:00:00, Routine, Ophth Soln  piperacillin-tazobactam extended infusion [4 hour] (Zosyn).: 3.375 gm, IVPB, Q8H, Rationale: Empiric (suspected infection), Indication: Pneumonia, 01/05/18 8:00:00, Routine, x 7 days, Stop: 01/12/18 7:59:00, mL TOTAL Volume 100, RATE: 25 mL/hr, Infuse over 4 hr, CrCl = or > 20 mL/min or CRRT,    Medications (20) Active  Scheduled: (5)  Albuterol-ipratropium 2.5-0.5 mg/3 mL nebulizer soln  3 mL, Nebulizer, Q4H  Chlorhexidine gluconate 0.12% ORAL RINSE 15 mL repack  15 mL, Oral Mucosa, Q12H  CISatracurium  12.465 mg 6.23 mL,  Slow IV Push, Once (scheduled)  piperacillin-tazobactam  3.375 gm, IVPB, Q8H  Polyvinyl alcohol tears ophth soln 15 mL  2 drop, Each Eye, Q4H  Continuous: (7)  CISatracurium 200 mg [3 mcg/kg/min] + Dextrose 5% 100 mL  100 mL, IV, 7.48 mL/hr  Dextrose 5% - 0.9% NaCl 1,000 mL  1,000 mL, IV, 20 mL/hr  Dextrose 5% 1,000 mL + sodium bicarbonate 8.4% 150 mEq  1,000 mL, IV, 80 mL/hr  fentaNYL 2,500 mcg [25 mcg/hr] + premixed in Sodium Chloride 0.9% 250 mL  250 mL, IV, 2.5 mL/hr  insulin regular human 100 unit [1 unit/hr] + Sodium Chloride 0.9% 100 mL  100 mL, IV, 1 mL/hr  midazolam 100 mg [2 mg/hr] + premixed in Sodium Chloride 0.9% 100 mL  100 mL, IV, 2 mL/hr  NORepinephrine 8 mg [1 mcg/min] + premixed in Sodium Chloride 0.9% 250 mL  250 mL, IV, 1.88 mL/hr  PRN: (8)  Dextrose (glucose) 40% 15 gm/37.5 gm oral gel UD  15 gm, Oral, As Directed PRN  Dextrose (glucose) 50% 25 gm/50 mL syringe  12.5 gm 25 mL, IV Push, As Directed PRN  FentaNYL 100 mcg/2 mL inj SDV  25 mcg 0.5 mL, IV Push, Q15 Minutes  Glucagon 1 mg/1 mL emergency kit SDV  1 mg 1 mL, IM, As Directed PRN  Insulin human regular 100 unit/mL inj 3 mL BULK  2-7 units, IV Push, As Directed PRN  Lacri-Lube (mineral oil-petrolatum) ophth oint 3.5 gm  1 application, Each Conj Sac, As Directed PRN  Midazolam PF 2 mg/2 mL inj SDV  2 mg 2 mL, Slow IV Push, Q10 Minutes  Polyvinyl alcohol tears ophth soln 15 mL  2 drop, Each Eye, Q2H        Physical Examination   VS/Measurements        Vitals between:   04-JAN-2018 06:35:54   TO   05-JAN-2018 06:35:54                   LAST RESULT MINIMUM MAXIMUM  Temperature 34.4 32.7 34.4  Heart Rate 99 89 150  Respiratory Rate 24 16 24  NISBP           121 69 175  NIDBP           79 50 119  NIMBP           93 58 133  SpO2                    93 68 100  FiO2                    1 0.70 1     General:  Intubated and sedated, Does not respond to commands. .    Eye:  Pupils nonreactive. .    Respiratory:  Bilateral coarse breath sounds. Wheezes  appreciated bilaterally. Intubated. .    Cardiovascular:  Tachycardic. Normal S1/S2. Pulses intact. Cool extremities. .    Gastrointestinal:  Soft, Non-tender, Non-distended.    Musculoskeletal:  Track marks noted on upper extrmities. .    Integumentary:  Dry, Cool.    Neurologic:  Pupils nonreactive. No withdrawl to pain. Occasional spontaneous movements. .       Review / Management   Laboratory results:       Labs between:  04-JAN-2018 06:35 to 05-JAN-2018 06:35    CBC:                 WBC  HgB  Hct  Plt  MCV  RDW   04-JAN-2018 (H) 32.8  (L) 11.7  37.6  438  (H) 100.5  12.5     DIFF:                 Seg  Neutroph//ABS  Lymph//ABS  Mono//ABS  EOS/ABS   04-JAN-2018 63  // (H) 23.6  // (H) 6.6  // (H) 2.0  // 0.3    BMP:                 Na  Cl  BUN  Glu   04-JAN-2018 (H) 146  107  18  (H) 134                              K  CO2  Cr  Ca                              (H) 5.3  (L) 19  (H) 1.67  8.7     CMP:                 AST  ALT  AlkPhos  Bili  Albumin   04-JAN-2018 25  22  65  (L) 0.1  (L) 2.8     COAG:                 INR  PT  PTT  Ddimer  Fibrinogen    04-JAN-2018 1.3  (H) 13.6                        .    Radiology results     Result title:  XR CHEST PORTABLE 1V  Result status:  Final  Verified by:  KAREN SALGADO on 01/05/2018 08:31  FINDINGS: Portable chest.Medial right and left upper and lower lobe atelectasis/consolidation.  Not significantly changed.No pleural effusion or pneumothorax.  No evidence of pulmonary vascular congestion.The tracheal tube tip is within 2 cm in the michael.  Nasogastric tube tip is below the level of this film  IMPRESSION: Bilateral medial airspace consolidation/atelectasis.    Result title:  CT CHEST PULM EMBOLISM W CON/CT ABDOMEN AND PELVIS W CON  Result status:  Final  Verified by:  LONDON TIPTON on 01/05/2018 07:54  HISTORY: Cardiac arrest, abdominal pain, chest painCOMPARISON: NoneComputerized tomography of the chest abdomen and pelvis is obtained after IV contrast  injection 94 mL of Omnipaque 350.  Protocol for pulmonary embolism was used.  Abdomen and pelvis was scanned afterwords.  Adjustment of the mA and/or kV was done according to the patient's size.Exam Limited by blurring from respiratory motion.  Subsegmental vessels cannot be well identified.  No central or segmental filling defects.  No mediastinal or definite hilar masses.  There is dense bilateral lower lobe consolidation.  There is a nondisplaced lower sternal fracture and possibly a fracture around the angle of Richard.Normal liver and spleen.  Normal adrenals.  Pancreas difficult to separate from unopacified bowel loops around it.  No obvious mass.  Pancreas may be somewhat lucent with fatty replacement.  Normal kidneys.  No hydronephrosis.  Fluid throughout large but no definite dilation.  No free fluid or retroperitoneal masses.In the pelvis, there is no free fluid, no pelvic sidewall masses.  Negative for diverticulitis.  Appendix not definitely identified but no evidence for inflammation.  Minimal distention of the rectum with stool.  IMPRESSION:1.  Some limitation in the chest with blurring of smaller subsegmental vessels.  No no PE in the vessels through the segmental vessels.2.  Dense consolidation/aspiration in both lower lobes.3.  Fracture of the sternal body, possibly fracture around the angle of Richard.4.  Diffuse fluid throughout the large bowel suggesting possibility of gastroenteritis but otherwise no definite abnormalities.    Result title:  CT CERVICAL SPINE WO CON  Result status:  Final  Verified by:  KIZZY IGLESIAS on 01/05/2018 07:20  FINDINGS: There is no cervical spine fracture or dislocation.  There is left-sided facet arthropathy at the C2-C3 level.  There is minimal degenerative disc disease at the C5-C6 and C6-C7 levels.  There is no level of spinal stenosis.Endotracheal tube and orogastric tube are present.  There is opacity at the left lung apex  IMPRESSION:No acute cervical spine  abnormality.    Result title:  CT HEAD OR BRAIN WO CON  Result status:  Final  Verified by:  KIZZY IGLESIAS on 01/05/2018 07:17  FINDINGS: The ventricles are normal in size.  There is no intracranial hemorrhage.  There is no dense middle cerebral or basilar artery.  There is no evidence of acute cortical infarction.  There is normal gray-white matter differentiation.  There is no evidence of intracranial mass.  There is no herniation.  The basilar cisterns are normally visualized.  There is no skull abnormality.  There is right maxillary and ethmoid sinus opacification  IMPRESSION:1.  Normal CT scan of the brain.2.  Right maxillary and ethmoid sinus opacification.     Lines and Tubes:    LINES  Central Catheter Left, Internal Jugular   Charted: 01/05/18 06:00  Inserted: 01/05/18   Days Since Insertion: 0 days  Indication of Use: IV Drugs, Limited Peripheral Access  Peripheral Intravenous Forearm Right   Gauge: 20   Charted: 01/05/18 06:00  Inserted: 01/05/18   Days Since Insertion: 0 days  Indication of Use: Saline Lock  Peripheral Intravenous Antecubital Left   Gauge: 20   Charted: 01/05/18 06:00  Inserted: 01/05/18   Days Since Insertion: 0 days  Indication of Use: Saline Lock  Peripheral Intravenous Forearm Left   Gauge: 18   Charted: 01/05/18 06:00  Inserted: 01/05/18   Days Since Insertion: 0 days  Indication of Use: Saline Lock    DRAINS AND TUBES  Urinary Catheter   Type: Temperature probe   Cath Size: 18 Fr.   Charted: 01/05/18 00:00  Inserted: 01/05/18   Days Since Insertion:  Usage: Critically Ill Patient Requiring Accurate Output  .       Impression and Plan   Dx and Plan:  Diagnosis       Assessment and Plan:  Patient is a 39yo female w/ a history of illicit drug abuse including heroin who was found unresponsive and s/p cardiopulmonary arrest.     #Cardiac Arrest s/p ROSC:  Suspected respiratory arrest secondary to heroin OD.  Less likely cardiogenic arrest.  #ARDS  #SIRS  #Bilateral pulmonary  consolidations, likely secondary to aspiration.   #Metabolic Acidosis  #Respiratory Acidosis  #Lactic Acidosis  #Heroin abuse  #Hypernatremia  #Hyperkalemia  #Elevated Creatinine:  Most likely BESSY vs less likely CKD    -Continue ventilation with AC ARDS settings  -ABGs prn  -Will paralyze with cisatracurium. Discontinue once FiO2 0.6.  -Patient will not be cooled. Do not allow patient to have fevers.   -DuoNebs Q4H scheduled and prn   -Continue zosyn  -Follow up blood cultures, HIV, hepatitis panel  -Continue levophed, phenylephrine. Consider adding vasopressin and epinephrine last if needed. Wean as tolerated.  -MAPS > 65  -TTE pending  -STAT repeat labs: CMP, CBC, ABG, Lactic Acid, coags  -Raman for accurate I&Os in critically ill patient  -Cards consulted  -Consider neuro consult    FEN: D5W, will replete as needed, NPO  VTE prophylaxis: Held for now  GI prophylaxis: Famotidine  CODE STATUS: FULL CODE, nondecisional. Mother is surrogate. Mother's phone number: [phone]    Will discuss w/ team and attending. Please await final attending recommendations.     Justin Saucedo DO  PGY-1 Family Medicine  Please contact via [employer].     .               Electronically Signed On 01/05/2018 11:51  __________________________________________________   JUSTIN OVIEDO      Electronically Signed On 01/05/2018 19:20  __________________________________________________   LINA, DAR E              [facility] Hospitalist Attending Addendum- I saw and examined pt independently around noon. I discussed case w/ ICU team and agree w/ HPI and A/P as documented by resident wth the following addenda/corrections. Please see resident note for full HPI details. No family members at bedside during my exam.  Per hospital notes, 41 yo F w/ PMH of asthma, heroin abuse who presented to the ER after cardiac arrest. Per hospital notes, pt was at friend's house, went to the bathroom and possibly snorted heroin while in the  bathroom. It is unknown how long the patient was in the bathroom. When she exited the bathroom she vomited and then collapsed ~10pm. 911 was called ~10:17pm, at which time friend started CPR. EMS arrived at 10:23pm. Pt received epi x 3, atropine x 1, found to have vfib and was shocked x 1, last rhythm check by EMS showed asystole. Upon arrival to the ER, CPR was continued, pt was intubated (suctioned copious amounts of vomitus), pt received epi, calcium chloride, and narcan, and had ROSC. Her bp was low so she was started on levophed. Initial ekg in the ER showed afib w/ diffuse ST depression. repeat ekg showed nsr. Initial trop was 0.04. Cardiology was consulted. Per ER notes, bedside US showed normal EF. Pt was admitted to ICU for further treatment.  I d/w pt's boyfriend of 12 years, he was not with her last night. He states she has not been acutely ill recently. He states he doesn't think she has been having recent fever, n/v/diarrhea. She has chronic cough which has been stable and not acutely worse. He states she uses her asthma inhaler daily for the past several months, no recent increase in inhaler use. he states she uses heroin daily.  ROS- unable to obtain as pt intubated/sedated  PMH- asthma, substance abuse  PSxHx- none  Social hx- substance abuse  -smoked <1 ppd x 20 years  -no etoh use  Fam hx- father w/ CAD, first MI in his 60s. sister w/ bipolar disorder.  PE:  Gen- pt intubated, sedated, paralyzed. HEENT- pupils constricted, equal in size. Neck- supple. Lung- non labored, vented BS. Heart- rrr, no murmur. Abd- soft, quiet BS, no grimacing w/ palpation (however pt sedated/paralyzed). Vasc- no peripheral pitting edema, both feet a little cool to touch, right pedal pulse difficult to palpate, right toes dusky color. Skin- pale/fair complexion. Neuro- unresponsive, sedated, paralyzed.  A/P  Cardiopulmonary arrest prior to admission- concern for respiratory arrest (aspiration event and possible PNA vs  respiratory depression from heroin use) causing cardiac arrest  -cardiology team consulted, not concerned for primary cardiac arrest or ACS.  -trop 0.04 on admission->0.40. not trending per icu/cardiology team.  -f/u official echo report  -ct PE: No no PE in the vessels through the segmental vessels. Dense consolidation/aspiration in both lower lobes. Fracture of the sternal body, possibly fracture around the angle of Richard. Diffuse fluid throughout the large bowel suggesting possibility of gastroenteritis but otherwise no definite abnormalities.  -may need neuro consult if she does not wake up appropriately.    Acute respiratory failure- 2/2 above  -management per pulm/ICU team. Currently intubated, sedated, paralyzed. FiO2 100% during my exam, now down to 80%. PEEP currently at 15.    Shock- due to cardiac arrest vs septic shock  -con't ivf and pressors per icu team.  -f/u official echo report. per ER note, bedside US showed normal EF  -ct chest/abd/pelvis:  Dense consolidation/aspiration in both lower lobes. Diffuse fluid throughout the large bowel suggesting possibility of gastroenteritis but otherwise no definite abnormalities.  -afebrile, wbc 32.8->14.6  -con't vanc/zosyn  -f/u blood culture, hiv, hepatitis panel, urinalysis  Acidosis- respiratory and metabolic  -improved  -lactate 13.5->2.1  Heroin abuse, concern for overdose  -UA + for opiates.  Transaminitis- suspect elevated due to shock, con't to monitor. f/u hepatitis panel.  Elevated troponin-due to type 2 MI from demand ischemia  New onset afib- converted to NSR  -con't to monitor on tele. if she has recurrent afib will need to consider AC, however at this time her prognosis seems poor.  Acute kidney injury POA- cr downtrending  Hyperkalemia- resolved  Prophx- heparin sq  PCP- none  BERNADETTE Marion MD- discussed all of the above with pt's Mother's via phone, [phone].           Electronically Signed On 01/05/2018  19:20  __________________________________________________   DAR MILLS- kamille's shock was present on admission which I suspect was multifactorial, due to her cardiac arrest prior to admission as well as concern for septic shock present on admission due to possible aspiration pneumonia.           Electronically Signed On 01/22/2018 14:26  __________________________________________________   DAR MILLS

## 2019-10-01 ENCOUNTER — DOCUMENTATION ONLY (OUTPATIENT)
Dept: FAMILY MEDICINE | Facility: CLINIC | Age: 69
End: 2019-10-01

## 2019-10-01 NOTE — PROGRESS NOTES
To be completed in Nursing note:    Please reference list for forms that require a visit for completion.  Please remind patients that providers are given 3-5 business days to complete and return forms.      Form type:  [facility] / Home Health Certification and Plan of Care     Date form received:  10/01/2019    Date form completed by Physician:  10/03/2019    How was form returned to patient (mailed, faxed, or at  for patient to ): will fax to [phone]    Date form mailed/faxed/left at  for patient and sent to HIM for scanning: 10/03/2019      Once form is left for patient, faxed, or mailed PCS will then close the documentation only encounter.

## 2019-10-04 ENCOUNTER — MEDICAL CORRESPONDENCE (OUTPATIENT)
Dept: HEALTH INFORMATION MANAGEMENT | Facility: CLINIC | Age: 69
End: 2019-10-04

## 2019-10-09 ENCOUNTER — TELEPHONE (OUTPATIENT)
Dept: FAMILY MEDICINE | Facility: CLINIC | Age: 69
End: 2019-10-09

## 2019-10-09 DIAGNOSIS — S31.000D WOUND OF SACRAL REGION, SUBSEQUENT ENCOUNTER: Primary | ICD-10-CM

## 2019-10-09 RX ORDER — BACITRACIN ZINC 500 [USP'U]/G
OINTMENT TOPICAL 2 TIMES DAILY
Qty: 7 G | Refills: 11 | Status: SHIPPED | OUTPATIENT
Start: 2019-10-09 | End: 2020-01-01

## 2019-10-09 NOTE — TELEPHONE ENCOUNTER
[facility] phone call message- patient requesting a refill:    Full Medication Name:     Bacitracin Ointment 500gm     Dose: ?    Pharmacy confirmed as     [facility] - [place], [place] - [address]  [facility] [id number]  Phone: [phone] Fax: [phone]  : Yes    Additional Comments:    Pt received medication when in hospital and needs more.     OK to leave a message on voice mail? Yes    Primary language: English      needed? No    Call taken on October 9, 2019 at 9:41 AM by Steffanie Long CMA

## 2019-10-25 ENCOUNTER — DOCUMENTATION ONLY (OUTPATIENT)
Dept: FAMILY MEDICINE | Facility: CLINIC | Age: 69
End: 2019-10-25

## 2019-10-25 NOTE — PROGRESS NOTES
To be completed in Nursing note:    Please reference list for forms that require a visit for completion.  Please remind patients that providers are given 3-5 business days to complete and return forms.      Form type:    [facility] (SUCTION, TRACH CARE ETC.)    Date form received:  10/24/2019    Date form completed by Physician:  10/29/2019    How was form returned to patient (mailed, faxed, or at  for patient to ):  Will fax to [phone]    Date form mailed/faxed/left at  for patient and sent to HIM for scanning: 10/29/2019      Once form is left for patient, faxed, or mailed PCS will then close the documentation only encounter.

## 2019-10-29 ENCOUNTER — MEDICAL CORRESPONDENCE (OUTPATIENT)
Dept: HEALTH INFORMATION MANAGEMENT | Facility: CLINIC | Age: 69
End: 2019-10-29

## 2019-11-08 ENCOUNTER — MEDICAL CORRESPONDENCE (OUTPATIENT)
Dept: HEALTH INFORMATION MANAGEMENT | Facility: CLINIC | Age: 69
End: 2019-11-08

## 2019-11-08 ENCOUNTER — DOCUMENTATION ONLY (OUTPATIENT)
Dept: FAMILY MEDICINE | Facility: CLINIC | Age: 69
End: 2019-11-08

## 2019-11-08 NOTE — PROGRESS NOTES
To be completed in Nursing note:    Please reference list for forms that require a visit for completion.  Please remind patients that providers are given 3-5 business days to complete and return forms.      Form type:  [facility] / consulting documentation and Physician's Order     Date form received:  2019    Date form completed by Physician:  2019     How was form returned to patient (mailed, faxed, or at  for patient to ):  Fax to [phone]    Date form mailed/faxed/left at  for patient and sent to HIM for scannin2019      Once form is left for patient, faxed, or mailed PCS will then close the documentation only encounter.

## 2019-11-09 ENCOUNTER — MEDICAL CORRESPONDENCE (OUTPATIENT)
Dept: HEALTH INFORMATION MANAGEMENT | Facility: CLINIC | Age: 69
End: 2019-11-09

## 2019-11-13 NOTE — PROGRESS NOTES
Staff reported no concerns at this time but will give a list at the appointment    Kailee ANDERSON

## 2019-11-18 ENCOUNTER — DOCUMENTATION ONLY (OUTPATIENT)
Dept: FAMILY MEDICINE | Facility: CLINIC | Age: 69
End: 2019-11-18

## 2019-11-18 DIAGNOSIS — L60.0 INGROWN TOENAIL OF RIGHT FOOT WITH INFECTION: Primary | ICD-10-CM

## 2019-11-18 DIAGNOSIS — E11.9 TYPE 2 DIABETES MELLITUS WITHOUT COMPLICATION, WITHOUT LONG-TERM CURRENT USE OF INSULIN (H): ICD-10-CM

## 2019-11-18 RX ORDER — CEPHALEXIN 500 MG/1
500 CAPSULE ORAL 2 TIMES DAILY
Qty: 14 CAPSULE | Refills: 0 | Status: SHIPPED | OUTPATIENT
Start: 2019-11-18 | End: 2020-01-01

## 2019-11-18 NOTE — PROGRESS NOTES
Preceptor Attestation:   Patient seen, evaluated and discussed with the resident. I have verified the content of the note, which accurately reflects my assessment of the patient and the plan of care.   Supervising Physician:  Serafin Thompson MD.

## 2019-11-18 NOTE — PROGRESS NOTES
Subjective: Vlad Gleason is a 69 year old who is seen at home for follow up visit today.  Seen at [address] [place] [address] .    Also present at visit include nursing staff at the home.  Acute concerns today include:   1. Right toe pain. Has developed erythema and swelling around right big toe with surrounding tenderness. He denies any fever/chills. Believes that he has an ingrown toenail. Denies discharge from toe. Nursing staff clips his toenails with curved clipper.   2. Hyperglycemia: Started on metformin at previous visit. BGs have been in range of 116-150s. No symptoms of hypoglycemia.   3. Sacral ulcer: undergoing wound cares, need to sign paper for continued cares.   4. Influenza vaccine: Has not received the influenza vaccine this year. Unable to travel to clinic/pharmacy to receive one.     Chronic and Past Medical Problems include:  Patient Active Problem List   Diagnosis     Chronic respiratory failure with hypoxia and hypercapnia (H)     Obesity hypoventilation syndrome (H)     Dysphagia     Urinary retention     Sacral wound     Major depressive disorder with psychotic features (H)     Paroxysmal atrial fibrillation (H)     Surgical wound, non healing     Primary insomnia     Type 2 diabetes mellitus with complication, without long-term current use of insulin (H)       Social History:   Current activities:  Primarily bed bound. Trach/vent dependent and morbidly obese  Support services:  Home care staff, wound care.  Currently living family/friends:  Lives in a group home/care center with full RN support.   PMHX/PSHX/MEDS/ALLERGIES/SHX/FHX reviewed and updated in Epic.   MEDICATION LIST:  Current Outpatient Medications   Medication     acetaminophen (TYLENOL) 32 mg/mL solution     albuterol (2.5 MG/3ML) 0.083% neb solution     ascorbic acid (VITAMIN C) 500 MG tablet     aspirin 81 MG chewable tablet     bacitracin 500 UNIT/GM external ointment     blood glucose (NO BRAND SPECIFIED) lancets  standard     blood glucose (NO BRAND SPECIFIED) test strip     blood glucose monitoring (NO BRAND SPECIFIED) meter device kit     brimonidine (ALPHAGAN) 0.2 % ophthalmic solution     carvedilol (COREG) 3.125 MG tablet     diphenhydrAMINE (BENADRYL) 25 MG tablet     diphenhydrAMINE (BENADRYL) 25 MG tablet     Docusate Sodium (DIOCTO) 150 MG/15ML LIQD     EPINEPHrine PF (ADRENALIN) 1 MG/ML injection     ferrous sulfate 220 (44 FE) MG/5ML ELIX     fluticasone (ARNUITY ELLIPTA) 100 MCG/ACT inhaler     fluticasone (FLONASE) 50 MCG/ACT spray     gabapentin (NEURONTIN) 300 MG capsule     ipratropium - albuterol 0.5 mg/2.5 mg/3 mL (DUONEB) 0.5-2.5 (3) MG/3ML neb solution     loratadine (CLARITIN) 10 MG tablet     melatonin 3 MG tablet     metFORMIN (GLUCOPHAGE) 500 MG tablet     multivitamin, therapeutic (THERA-VIT) TABS tablet     nitroGLYcerin (NITROSTAT) 0.4 MG sublingual tablet     nystatin (MYCOSTATIN) 535838 UNIT/GM external cream     nystatin (MYCOSTATIN) 486735 UNIT/GM external powder     olopatadine (PATANOL) 0.1 % ophthalmic solution     omeprazole (PRILOSEC) 2 mg/mL SUSP     order for DME     order for DME     order for DME     order for DME     order for DME     order for DME     order for DME     order for DME     oxybutynin (DITROPAN XL) 10 MG 24 hr tablet     polyethylene glycol 3350 POWD     QUEtiapine (SEROQUEL) 100 MG tablet     Silver Nitrate 10 % OINT     sodium chloride (NEBUSAL) 3 % neb solution     terbinafine (LAMISIL AT) 1 % external cream     traZODone (DESYREL) 150 MG tablet     venlafaxine (EFFEXOR) 75 MG tablet     No current facility-administered medications for this visit.      Medications are managed by:  RN staff  Patient uses a pill box to manage their medications:  No  Patient has questions, concerns, or potential side effects/interactions from their medications:  No  GERIATRIC ROS:    Do you have difficulty getting around, watching TV or reading because of poor eyesight? No   Can you hear  normal conversational voice? Yes   Do you use hearing aides? No   Do you have problems with your memory? No   Do you often feel sad or depressed? Yes, has intermittent depressed mood over state of health and being bedbound   Have you unintentionally lost weight in the last 6 months? No   Do you have trouble with control of your bladder? Suprapubic catheter in place   Do you have trouble with control of your bowels? Incontinent, cares per RN staff  Have you had any falls in the past year? No    GENERAL ROS:   General: No unexplained weight gain or loss.  Resp: No worsening shortness of breath, cough or hemoptysis.   GI: No nausea or vomiting  : Voiding with suprapubic catheter   Skin: Right toe erythema/swelling  Extremities: Right toe swelling    Objective: There were no vitals taken for this visit.   Most recent weight:  No recent weight recorded other than hospitalizations.   Gen: Obese, bed bound, vent/trach dependent   HEENT: Head is atraumatic, EOMI, PERRL  CV: RRR, occasional extra beat - no murmurs, rubs, or gallups, heart sounds distant  Pulm: diminished breath sounds, no wheezes/rales/rhonchi, normal work of breathing  ABD: soft, nontender, BS intact  Extrem: atrophic, non pitting edema. Right big toe: erythema/edema around toenail, able to reduce toenail from bed, no drainage.   Psych: Euthymic, normal affect  Neuro: Moves both upper extremities    Preventative Screening:   Vaccinations reviewed and up to date: Due for influenza  Get up and Go test:  Not applicable    Code status: Full  Healthcare Directive, Living Will, POLST has been completed:  Still full code     Assessment/ Plan:  1. Ingrown toenail of right foot with infection  Right toenail with surrounding erythema/swelling. No discharge. Tender to touch. Patient with no systemic symptoms at this time. Will treat with 7 day course of abx. Discussed with nursing staff about clipping toenails after giving some time to grow and using a flat clipper.  Able to reduce nailbed at bedside. Discussed that if patient develops fevers or other systemic symptoms, they should call clinic or go to nearest ED.   - cephALEXin (KEFLEX) 500 MG capsule; Take 1 capsule (500 mg) by mouth 2 times daily for 7 days  Dispense: 14 capsule; Refill: 0    2. Type 2 diabetes mellitus without complication, without long-term current use of insulin (H)  Currently well controlled blood glucose, with ranges from 110s-150s. Still taking metformin 500mg BID. A1C in hospital was 8.4% at [facility] previously.  - Continue metformin    3. Influenza vaccine: Unable to access flu shot due to patient's bedbound and trach/vent dependence. Will ask clinic admin if able to receive vaccine at home visit.     4. Wound cares: Signed new prescription for ongoing wound cares, especially sacral ulcer. No complaints today.       RECOMMENDED FOLLOW UP:  2 months.    Level of Service:  82192    Total of 40 minutes was spent in face to face contact with patient with > 50% in counseling and coordination of care.  Options for treatment and/or follow-up care were reviewed with the patient. Vlad Gleason was engaged and actively involved in the decision making process. He verbalized understanding of the options discussed and was satisfied with the final plan.      Patient and plan discussed with attending physician, Dr. Serafin Thompson.    Moira Amaral MD PGY2  [facility]

## 2019-11-21 ENCOUNTER — MEDICAL CORRESPONDENCE (OUTPATIENT)
Dept: HEALTH INFORMATION MANAGEMENT | Facility: CLINIC | Age: 69
End: 2019-11-21

## 2019-11-22 ENCOUNTER — DOCUMENTATION ONLY (OUTPATIENT)
Dept: FAMILY MEDICINE | Facility: CLINIC | Age: 69
End: 2019-11-22

## 2019-11-22 NOTE — PROGRESS NOTES
To be completed in Nursing note:    Please reference list for forms that require a visit for completion.  Please remind patients that providers are given 3-5 business days to complete and return forms.      Form type:  [facility] / Physician's statement of Medical Necessity     Date form received:  2019    Date form completed by Physician:  2019    How was form returned to patient (mailed, faxed, or at  for patient to ):  Will fax to [phone]    Date form mailed/faxed/left at  for patient and sent to HIM for scannin2019      Once form is left for patient, faxed, or mailed PCS will then close the documentation only encounter.

## 2019-11-27 ENCOUNTER — DOCUMENTATION ONLY (OUTPATIENT)
Dept: FAMILY MEDICINE | Facility: CLINIC | Age: 69
End: 2019-11-27

## 2019-11-27 ENCOUNTER — TRANSFERRED RECORDS (OUTPATIENT)
Dept: HEALTH INFORMATION MANAGEMENT | Facility: CLINIC | Age: 69
End: 2019-11-27

## 2019-11-27 DIAGNOSIS — E11.8 TYPE 2 DIABETES MELLITUS WITH COMPLICATION, WITHOUT LONG-TERM CURRENT USE OF INSULIN (H): ICD-10-CM

## 2019-11-27 NOTE — PROGRESS NOTES
To be completed in Nursing note:    Please reference list for forms that require a visit for completion.  Please remind patients that providers are given 3-5 business days to complete and return forms.      Form type:  [employer] / Consulting Documentation and Physician's Order     Date form received:  2019    Date form completed by Physician:  2019    How was form returned to patient (mailed, faxed, or at  for patient to ):  Will fax to [phone]    Date form mailed/faxed/left at  for patient and sent to HIM for [email or web address]      Once form is left for patient, faxed, or mailed PCS will then close the documentation only encounter.

## 2019-12-02 ENCOUNTER — TRANSFERRED RECORDS (OUTPATIENT)
Dept: HEALTH INFORMATION MANAGEMENT | Facility: CLINIC | Age: 69
End: 2019-12-02

## 2019-12-03 ENCOUNTER — DOCUMENTATION ONLY (OUTPATIENT)
Dept: FAMILY MEDICINE | Facility: CLINIC | Age: 69
End: 2019-12-03

## 2019-12-03 ENCOUNTER — MEDICAL CORRESPONDENCE (OUTPATIENT)
Dept: HEALTH INFORMATION MANAGEMENT | Facility: CLINIC | Age: 69
End: 2019-12-03

## 2019-12-03 NOTE — PROGRESS NOTES
To be completed in Nursing note:    Please reference list for forms that require a visit for completion.  Please remind patients that providers are given 3-5 business days to complete and return forms.      Form type: Home Health Certification and Plan of Care from [facility]    Date form received: 12/3/19    Date form completed by Physician: 19    How was form returned to patient (mailed, faxed, or at  for patient to ): fax to [phone]    Date form mailed/faxed/left at  for patient and sent to HIM for scannin19    Once form is left for patient, faxed, or mailed PCS will then close the documentation only encounter.

## 2019-12-06 DIAGNOSIS — Z23 NEED FOR PROPHYLACTIC VACCINATION AND INOCULATION AGAINST INFLUENZA: Primary | ICD-10-CM

## 2019-12-09 ENCOUNTER — DOCUMENTATION ONLY (OUTPATIENT)
Dept: FAMILY MEDICINE | Facility: CLINIC | Age: 69
End: 2019-12-09

## 2019-12-09 NOTE — PROGRESS NOTES
To be completed in Nursing note:    Please reference list for forms that require a visit for completion.  Please remind patients that providers are given 3-5 business days to complete and return forms.      Form type: Physician Order from [facility]    Date form received: 19    Date form completed by Physician: 12/10/19    How was form returned to patient (mailed, faxed, or at  for patient to ): fax to [phone] and [phone]    Date form mailed/faxed/left at  for patient and sent to HIM for scannin/10/12    Once form is left for patient, faxed, or mailed PCS will then close the documentation only encounter.

## 2019-12-10 ENCOUNTER — MEDICAL CORRESPONDENCE (OUTPATIENT)
Dept: HEALTH INFORMATION MANAGEMENT | Facility: CLINIC | Age: 69
End: 2019-12-10

## 2019-12-10 DIAGNOSIS — E11.8 TYPE 2 DIABETES MELLITUS WITH COMPLICATION, WITHOUT LONG-TERM CURRENT USE OF INSULIN (H): Primary | ICD-10-CM

## 2019-12-10 DIAGNOSIS — E11.8 TYPE 2 DIABETES MELLITUS WITH COMPLICATION, WITHOUT LONG-TERM CURRENT USE OF INSULIN (H): ICD-10-CM

## 2019-12-18 ENCOUNTER — TELEPHONE (OUTPATIENT)
Dept: FAMILY MEDICINE | Facility: CLINIC | Age: 69
End: 2019-12-18

## 2019-12-18 DIAGNOSIS — Z93.0 TRACHEOSTOMY IN PLACE (H): ICD-10-CM

## 2019-12-18 NOTE — TELEPHONE ENCOUNTER
[facility] phone call message- patient requesting a refill:    Full Medication Name: terbinafine     Dose: 1%    Pharmacy confirmed as       [facility] - [place], [place] - [address]  [facility] [address]  Phone: [phone] Fax: [phone]      : Yes    Additional Comments: the pt needs a new prescription      OK to leave a message on voice mail? Yes    Primary language: English      needed? No    Call taken on December 18, 2019 at 4:23 PM by Pierre Dukes

## 2019-12-19 ENCOUNTER — MEDICAL CORRESPONDENCE (OUTPATIENT)
Dept: HEALTH INFORMATION MANAGEMENT | Facility: CLINIC | Age: 69
End: 2019-12-19

## 2019-12-19 RX ORDER — PRENATAL VIT 91/IRON/FOLIC/DHA 28-975-200
COMBINATION PACKAGE (EA) ORAL 2 TIMES DAILY PRN
Qty: 30 G | Refills: 3 | Status: SHIPPED | OUTPATIENT
Start: 2019-12-19

## 2019-12-20 ENCOUNTER — TRANSFERRED RECORDS (OUTPATIENT)
Dept: HEALTH INFORMATION MANAGEMENT | Facility: CLINIC | Age: 69
End: 2019-12-20

## 2019-12-26 ENCOUNTER — DOCUMENTATION ONLY (OUTPATIENT)
Dept: FAMILY MEDICINE | Facility: CLINIC | Age: 69
End: 2019-12-26

## 2019-12-26 NOTE — PROGRESS NOTES
To be completed in Nursing note:    Please reference list for forms that require a visit for completion.  Please remind patients that providers are given 3-5 business days to complete and return forms.      Form type: [facility]    Date form received:  2019    Date form completed by Physician:  2019    How was form returned to patient (mailed, faxed, or at  for patient to ):  Fax to [phone]    Date form mailed/faxed/left at  for patient and sent to HIM for scannin2019      Once form is left for patient, faxed, or mailed PCS will then close the documentation only encounter.

## 2020-01-01 ENCOUNTER — DOCUMENTATION ONLY (OUTPATIENT)
Dept: FAMILY MEDICINE | Facility: CLINIC | Age: 70
End: 2020-01-01

## 2020-01-01 ENCOUNTER — MEDICAL CORRESPONDENCE (OUTPATIENT)
Dept: HEALTH INFORMATION MANAGEMENT | Facility: CLINIC | Age: 70
End: 2020-01-01

## 2020-01-01 ENCOUNTER — TRANSFERRED RECORDS (OUTPATIENT)
Dept: HEALTH INFORMATION MANAGEMENT | Facility: CLINIC | Age: 70
End: 2020-01-01

## 2020-01-01 ENCOUNTER — TELEPHONE (OUTPATIENT)
Dept: FAMILY MEDICINE | Facility: CLINIC | Age: 70
End: 2020-01-01

## 2020-01-01 ENCOUNTER — VIRTUAL VISIT (OUTPATIENT)
Dept: FAMILY MEDICINE | Facility: CLINIC | Age: 70
End: 2020-01-01
Payer: MEDICARE

## 2020-01-01 ENCOUNTER — APPOINTMENT (OUTPATIENT)
Dept: FAMILY MEDICINE | Facility: CLINIC | Age: 70
End: 2020-01-01
Payer: MEDICARE

## 2020-01-01 VITALS — TEMPERATURE: 96.4 F | OXYGEN SATURATION: 97 % | HEART RATE: 88 BPM

## 2020-01-01 DIAGNOSIS — E66.2 OBESITY HYPOVENTILATION SYNDROME (H): ICD-10-CM

## 2020-01-01 DIAGNOSIS — Z93.0 TRACHEOSTOMY IN PLACE (H): ICD-10-CM

## 2020-01-01 DIAGNOSIS — J96.11 CHRONIC RESPIRATORY FAILURE WITH HYPOXIA AND HYPERCAPNIA (H): Primary | ICD-10-CM

## 2020-01-01 DIAGNOSIS — J96.11 CHRONIC RESPIRATORY FAILURE WITH HYPOXIA AND HYPERCAPNIA (H): ICD-10-CM

## 2020-01-01 DIAGNOSIS — E11.8 TYPE 2 DIABETES MELLITUS WITH COMPLICATION, WITHOUT LONG-TERM CURRENT USE OF INSULIN (H): ICD-10-CM

## 2020-01-01 DIAGNOSIS — J18.9 PNEUMONIA OF LEFT LOWER LOBE DUE TO INFECTIOUS ORGANISM: Primary | ICD-10-CM

## 2020-01-01 DIAGNOSIS — S31.000D WOUND OF SACRAL REGION, SUBSEQUENT ENCOUNTER: ICD-10-CM

## 2020-01-01 DIAGNOSIS — B49 INFECTION DUE TO FUNGUS: ICD-10-CM

## 2020-01-01 DIAGNOSIS — E11.8 TYPE 2 DIABETES MELLITUS WITH COMPLICATION, WITHOUT LONG-TERM CURRENT USE OF INSULIN (H): Primary | ICD-10-CM

## 2020-01-01 DIAGNOSIS — F32.3 MAJOR DEPRESSIVE DISORDER WITH PSYCHOTIC FEATURES (H): ICD-10-CM

## 2020-01-01 DIAGNOSIS — F33.3 SEVERE EPISODE OF RECURRENT MAJOR DEPRESSIVE DISORDER, WITH PSYCHOTIC FEATURES (H): ICD-10-CM

## 2020-01-01 DIAGNOSIS — J96.12 CHRONIC RESPIRATORY FAILURE WITH HYPOXIA AND HYPERCAPNIA (H): Primary | ICD-10-CM

## 2020-01-01 DIAGNOSIS — J96.12 CHRONIC RESPIRATORY FAILURE WITH HYPOXIA AND HYPERCAPNIA (H): ICD-10-CM

## 2020-01-01 DIAGNOSIS — K59.09 OTHER CONSTIPATION: Primary | ICD-10-CM

## 2020-01-01 DIAGNOSIS — N20.1 BILATERAL URETERAL CALCULI: ICD-10-CM

## 2020-01-01 DIAGNOSIS — F51.01 PRIMARY INSOMNIA: ICD-10-CM

## 2020-01-01 DIAGNOSIS — T81.89XS NON-HEALING SURGICAL WOUND, SEQUELA: ICD-10-CM

## 2020-01-01 DIAGNOSIS — T81.89XS NON-HEALING SURGICAL WOUND, SEQUELA: Primary | ICD-10-CM

## 2020-01-01 DIAGNOSIS — J10.1 INFLUENZA A VIRUS PRESENT: Primary | ICD-10-CM

## 2020-01-01 DIAGNOSIS — D50.9 IRON DEFICIENCY ANEMIA, UNSPECIFIED IRON DEFICIENCY ANEMIA TYPE: Primary | ICD-10-CM

## 2020-01-01 LAB — HBA1C MFR BLD: 7.9 % (ref 0–5.6)

## 2020-01-01 RX ORDER — LEVOFLOXACIN 500 MG/1
500 TABLET, FILM COATED ORAL DAILY
Qty: 7 TABLET | Refills: 0 | Status: SHIPPED | OUTPATIENT
Start: 2020-01-01 | End: 2020-01-01

## 2020-01-01 RX ORDER — QUETIAPINE FUMARATE 100 MG/1
TABLET, FILM COATED ORAL
Qty: 90 TABLET | Refills: 11 | Status: SHIPPED | OUTPATIENT
Start: 2020-01-01

## 2020-01-01 RX ORDER — GABAPENTIN 300 MG/1
300 CAPSULE ORAL DAILY PRN
Qty: 30 CAPSULE | Refills: 11 | Status: SHIPPED | OUTPATIENT
Start: 2020-01-01

## 2020-01-01 RX ORDER — OSELTAMIVIR PHOSPHATE 75 MG/1
75 CAPSULE ORAL DAILY
Qty: 10 CAPSULE | Refills: 0 | Status: SHIPPED | OUTPATIENT
Start: 2020-01-01 | End: 2020-01-01

## 2020-01-01 RX ORDER — HYDROCORTISONE 2.5 %
CREAM (GRAM) TOPICAL 2 TIMES DAILY PRN
Qty: 30 G | Refills: 1 | Status: SHIPPED | OUTPATIENT
Start: 2020-01-01

## 2020-01-01 RX ORDER — BACITRACIN ZINC 500 [USP'U]/G
OINTMENT TOPICAL 2 TIMES DAILY PRN
Qty: 7 G | Refills: 11 | Status: SHIPPED | OUTPATIENT
Start: 2020-01-01

## 2020-01-01 RX ORDER — SODIUM CHLORIDE FOR INHALATION 3 %
3 VIAL, NEBULIZER (ML) INHALATION 2 TIMES DAILY
Qty: 15 ML | Refills: 11 | Status: SHIPPED | OUTPATIENT
Start: 2020-01-01

## 2020-01-01 RX ORDER — DIPHENHYDRAMINE HCL 25 MG
25 TABLET ORAL 4 TIMES DAILY PRN
Qty: 30 TABLET | Refills: 3 | Status: SHIPPED | OUTPATIENT
Start: 2020-01-01

## 2020-01-08 NOTE — PROGRESS NOTES
To be completed in Nursing note:    Please reference list for forms that require a visit for completion.  Please remind patients that providers are given 3-5 business days to complete and return forms.      Form type:  [facility] / Consulting Documentation     Date form received:  2020    Date form completed by Physician:  01/15/2020    How was form returned to patient (mailed, faxed, or at  for patient to ):  Will fax to [phone]     Date form mailed/faxed/left at  for patient and sent to HIM for scannin/15/2020            Once form is left for patient, faxed, or mailed PCS will then close the documentation only encounter.

## 2020-01-15 NOTE — PROGRESS NOTES
To be completed in Nursing note:    Please reference list for forms that require a visit for completion.  Please remind patients that providers are given 3-5 business days to complete and return forms.      Form type:  [employer] / Medicare Part B Documentation Request     Date form received:  2020    Date form completed by Physician:  01/15/2020    How was form returned to patient (mailed, faxed, or at  for patient to ):  Will fax to [phone]    Date form mailed/faxed/left at  for patient and sent to HIM for scannin/15/2020      Once form is left for patient, faxed, or mailed PCS will then close the documentation only encounter.

## 2020-01-28 PROBLEM — B49 INFECTION DUE TO FUNGUS: Status: ACTIVE | Noted: 2020-01-01

## 2020-01-28 NOTE — PROGRESS NOTES
Subjective: Vlad Gleason is a 69 year old who is seen at home for follow up visit today.  Seen at [address] [place], [place] [id number] .    Also present at visit include home nursing staff  Acute concerns today include: insomnia, recurrent foot wounds    Chronic and Past Medical Problems include:  Patient Active Problem List   Diagnosis     Chronic respiratory failure with hypoxia and hypercapnia (H)     Obesity hypoventilation syndrome (H)     Dysphagia     Urinary retention     Sacral wound     Major depressive disorder with psychotic features (H)     Paroxysmal atrial fibrillation (H)     Surgical wound, non healing     Primary insomnia     Type 2 diabetes mellitus with complication, without long-term current use of insulin (H)     Infection due to fungus       Social History:   Current activities:  Likes watching television  Support services:  24 hour home nursing   Currently living family/friends:  Lives in a home with other two other residents also requiring full nursing services  PMHX/PSHX/MEDS/ALLERGIES/SHX/FHX reviewed and updated in Epic.   MEDICATION LIST:  Current Outpatient Medications   Medication     acetaminophen (TYLENOL) 32 mg/mL solution     albuterol (2.5 MG/3ML) 0.083% neb solution     ascorbic acid (VITAMIN C) 500 MG tablet     aspirin 81 MG chewable tablet     bacitracin 500 UNIT/GM external ointment     blood glucose (NO BRAND SPECIFIED) lancets standard     blood glucose (NO BRAND SPECIFIED) lancets standard     blood glucose (NO BRAND SPECIFIED) test strip     blood glucose monitoring (NO BRAND SPECIFIED) meter device kit     brimonidine (ALPHAGAN) 0.2 % ophthalmic solution     carvedilol (COREG) 3.125 MG tablet     diphenhydrAMINE (BENADRYL) 25 MG tablet     diphenhydrAMINE (BENADRYL) 25 MG tablet     Docusate Sodium (DIOCTO) 150 MG/15ML LIQD     EPINEPHrine PF (ADRENALIN) 1 MG/ML injection     ferrous sulfate 220 (44 FE) MG/5ML ELIX     fluticasone (ARNUITY ELLIPTA) 100 MCG/ACT inhaler      fluticasone (FLONASE) 50 MCG/ACT spray     gabapentin (NEURONTIN) 300 MG capsule     ipratropium - albuterol 0.5 mg/2.5 mg/3 mL (DUONEB) 0.5-2.5 (3) MG/3ML neb solution     loratadine (CLARITIN) 10 MG tablet     melatonin 3 MG tablet     metFORMIN (GLUCOPHAGE) 500 MG tablet     multivitamin, therapeutic (THERA-VIT) TABS tablet     nitroGLYcerin (NITROSTAT) 0.4 MG sublingual tablet     nystatin (MYCOSTATIN) 447501 UNIT/GM external cream     nystatin (MYCOSTATIN) 542168 UNIT/GM external powder     olopatadine (PATANOL) 0.1 % ophthalmic solution     omeprazole (PRILOSEC) 2 mg/mL SUSP     order for DME     order for DME     order for DME     order for DME     order for DME     order for DME     order for DME     order for DME     oxybutynin (DITROPAN XL) 10 MG 24 hr tablet     polyethylene glycol 3350 POWD     QUEtiapine (SEROQUEL) 100 MG tablet     Silver Nitrate 10 % OINT     sodium chloride (NEBUSAL) 3 % neb solution     terbinafine (LAMISIL AT) 1 % external cream     traZODone (DESYREL) 150 MG tablet     venlafaxine (EFFEXOR) 75 MG tablet     No current facility-administered medications for this visit.      Medications are managed by:  SELF, FAMILY, HOME NURSE  Patient uses a pill box to manage their medications:  No  Patient has questions, concerns, or potential side effects/interactions from their medications:  No  GERIATRIC ROS:    Do you have difficulty getting around, watching TV or reading because of poor eyesight? No   Can you hear normal conversational voice? Yes   Do you use hearing aides? No   Do you have problems with your memory? No   Do you often feel sad or depressed? Yes, intermittently  Have you unintentionally lost weight in the last 6 months? No   Do you have trouble with control of your bladder? No, catheter in place  Do you have trouble with control of your bowels? Yes, RN assistance required     GENERAL ROS:   General: Difficulty falling asleep  Resp: No worsening shortness of breath.  Persistent cough  GI: No worsening constipation, no diarrhea, no nausea or vomiting  : Suprapubic catheter in place  Skin: Sore areas develop around big toes  Extremities:  No pain, extremity weakness or balance troubles    Objective: There were no vitals taken for this visit.    Gen: Obese, reclined in bed. Trach/and vent in place  HEENT: Head is atraumatic, decent dentition  CV: RRR, two PACs noted in 30 seconds - no murmurs  Pulm: Diminished breath sounds. Breathing comfortably with trach in place  ABD: Obese, soft, nontender, BS intact  Extrem: There is yellowing and thickening of multiple toes on both feet. Right great toe with generalized erythema of the distal phalanx, tender to palpation. There is clotted blood under the nail   Psych: Euthymic    POA: unknown  Code status: Full  Healthcare Directive, Living Will, POLST has been completed:  No     Assessment/ Plan:  (J96.11,  J96.12) Chronic respiratory failure with hypoxia and hypercapnia (H)  (primary encounter diagnosis)  Comment: Stable    (F32.3) Major depressive disorder with psychotic features (H)  Comment: Stable  Plan: Adjusting medications as below    (F51.01) Primary insomnia  Comment: Patient and staff report difficulty with falling asleep and staying asleep. Pt is currently on venlafaxine 150mg bid, this can have an activating effect. Also on seroquel 100mg bid.  Plan: Decrease venlafaxine to 100mg bid. Increase seroquel to 100mg qAM, 200mg qPM    (B49) Infection due to fungus  Comment: There is yellowed thickening of multiple toes bilaterally, worst on the great toes. Right great toe also with erythema and tenderness to palpation. There is evident fungal infection, will try treating this first as there are no other symptoms or systemic findings suggestive of bacterial infection. Clinical appearance not consistent with typical gout presentation.  Plan: Apply topical clotrimazole to great toes bilaterally until redness improves      RECOMMENDED  FOLLOW UP:  2 months.    Level of Service:  89288    Total of 30 minutes was spent in face to face contact with patient with > 50% in counseling and coordination of care.  Options for treatment and/or follow-up care were reviewed with the patient. Vlad Gleason was engaged and actively involved in the decision making process. He verbalized understanding of the options discussed and was satisfied with the final plan.    Patient seen and discussed with Dr. Frederick Kevin MD

## 2020-01-30 NOTE — PROGRESS NOTES
Preceptor Attestation:   Patient seen, evaluated and discussed with the resident. I have verified the content of the note, which accurately reflects my assessment of the patient and the plan of care.   Supervising Physician:  Jonathan Mack MD.

## 2020-02-03 NOTE — PROGRESS NOTES
To be completed in Nursing note:    Please reference list for forms that require a visit for completion.  Please remind patients that providers are given 3-5 business days to complete and return forms.      Form type:  [facility] / Home Health certification and Plan of care      Date form received:  2020    Date form completed by Physician:  2020    How was form returned to patient (mailed, faxed, or at  for patient to ):  Fax to [phone]    Date form mailed/faxed/left at  for patient and sent to HIM for scannin2020         Once form is left for patient, faxed, or mailed PCS will then close the documentation only encounter.

## 2020-02-04 NOTE — PROGRESS NOTES
To be completed in Nursing note:    Please reference list for forms that require a visit for completion.  Please remind patients that providers are given 3-5 business days to complete and return forms.      Form type:  [facility] / Home Health certification and plan of care     Date form received:  2020    Date form completed by Physician:  2020    How was form returned to patient (mailed, faxed, or at  for patient to ):  Fax to [phone]    Date form mailed/faxed/left at  for patient and sent to HIM for scannin2020      Once form is left for patient, faxed, or mailed PCS will then close the documentation only encounter.

## 2020-02-10 NOTE — PROGRESS NOTES
To be completed in Nursing note:    Please reference list for forms that require a visit for completion.  Please remind patients that providers are given 3-5 business days to complete and return forms.      Form type:  [facility] / Consulting documentation     Date form received:  02/10/2022     Date form completed by Physician:  2020    How was form returned to patient (mailed, faxed, or at  for patient to ):  Will fax to [phone]    Date form mailed/faxed/left at  for patient and sent to HIM for scannin2020        Once form is left for patient, faxed, or mailed PCS will then close the documentation only encounter.

## 2020-02-11 NOTE — TELEPHONE ENCOUNTER
[facility] phone call message-patient reporting a symptom:     Symptom:     Pt complained of pain in his left upper lobe when breathing. Stats are lower than usual. His liter changed to 4 from 3 and was not effective.     Temp 98.9  P: 88  113/91    Same Day Visit Offered: No    Additional comments:     In the past a chest ray was ordered.     OK to leave message on voice mail? Yes    Primary language: English      needed? No    Call taken on February 11, 2020 at 8:33 AM by Steffanie Long CMA

## 2020-02-11 NOTE — TELEPHONE ENCOUNTER
Order for chest xray relayed to Rekha Chaidez who relays report will be faxed to clinic when available.    BTRN

## 2020-02-11 NOTE — TELEPHONE ENCOUNTER
Please contact staff at Vlad's home and have them obtain a chest Xray. Diagnosis: hypoxia, upper left chest pain.    Result may need to be routed to afternoon preceptor if I am not available to review.    Jonathan Mack MD

## 2020-02-13 NOTE — PROGRESS NOTES
To be completed in Nursing note:    Please reference list for forms that require a visit for completion.  Please remind patients that providers are given 3-5 business days to complete and return forms.      Form type:  [facility] / Exam Requisition and [facility] [employer] for Physician Order     Date form received:   2020    Date form completed by Physician:  2020    How was form returned to patient (mailed, faxed, or at  for patient to ):  Fax to [phone] and [phone]    Date form mailed/faxed/left at  for patient and sent to HIM for scannin2020         Once form is left for patient, faxed, or mailed PCS will then close the documentation only encounter.

## 2020-03-03 NOTE — PROGRESS NOTES
To be completed in Nursing note:    Please reference list for forms that require a visit for completion.  Please remind patients that providers are given 3-5 business days to complete and return forms.      Form type:  [facility] [place] / Home Health Certification And Plan of Care     Date form received:  2020    Date form completed by Physician:2020    How was form returned to patient (mailed, faxed, or at  for patient to ):  Will fax to [phone]    Date form mailed/faxed/left at  for patient and sent to HIM for scannin2020        Once form is left for patient, faxed, or mailed PCS will then close the documentation only encounter.

## 2020-03-09 NOTE — TELEPHONE ENCOUNTER
[facility] phone call message- general phone call:    Reason for call: She is in the home with the patient and needs to talk to a nurse.    Action desired: call back.    Return call needed: Yes    OK to leave a message on voice mail? Yes    Advised patient to response may take up to 2 business days: Yes    Primary language: English      needed? No    Call taken on March 9, 2020 at 10:17 AM by Latricia Mercado

## 2020-03-09 NOTE — TELEPHONE ENCOUNTER
Nurse TAHIR reports pt c/o pain at left shoulder, on inspection a lump size of a soft ball to a grapefriut noted on middle of his left collarbone. Patient c/o pain with movement.  Tylenol prn not sufficient for pain control. Resp. therapist present at the time and thinks he may have bursitis.    TAHIR RN can be contacted at [phone]    Note routed to Dr.Berg Kailee ANDERSON

## 2020-03-09 NOTE — TELEPHONE ENCOUNTER
Please have Vlad Gleason's staff:  1) Obtain clavicle xray (portable, if possible)  2) Have Vlad brought to ED for evaluation if unable to obtain Xray, OR tylenol not controlling pain.    Jonathan Mack MD

## 2020-03-10 NOTE — PROGRESS NOTES
To be completed in Nursing note:    Please reference list for forms that require a visit for completion.  Please remind patients that providers are given 3-5 business days to complete and return forms.      Form type:  [facility] / Physician Order     Date form received:  2020    Date form completed by Physician:  2020    How was form returned to patient (mailed, faxed, or at  for patient to ):  Will fax to [phone]    Date form mailed/faxed/left at  for patient and sent to HIM for scannin2020        Once form is left for patient, faxed, or mailed PCS will then close the documentation only encounter.

## 2020-03-10 NOTE — TELEPHONE ENCOUNTER
Rekha ANDERSON informed that script has been called to the pharmacy.    RN also relay's x-ray results  of left clavicle from 3/9/20 as:  Normal.    Note routed to Dr.Berg Kailee ANDERSON

## 2020-03-10 NOTE — TELEPHONE ENCOUNTER
[facility] phone call message-patient reporting a symptom:     Symptom: They have four patients in the home and one of them was diagnosised with influenza A and they are wondering if you want to do anything for Vlad.    Same Day Visit Offered: no    Additional comments: none    OK to leave message on voice mail? Yes    Primary language: English      needed? No    Call taken on March 10, 2020 at 11:35 AM by Jocy Borges

## 2020-03-10 NOTE — TELEPHONE ENCOUNTER
Please notify staff at [facility]:    Rx (Influenza prevention dosing) sent to [facility].    Jonathan Mack MD

## 2020-03-12 NOTE — TELEPHONE ENCOUNTER
Answering Service Documentation    Nursing home called as Vlad has not had a BM in 2-3 days. On exam he has active bowel movements in all 4 quadrants and his abdomen is soft and non-tender per nursing staff. VS are normal. He is not in any pain. They are unsure if he is passing gas. He normally has a bowel movement roughly every day. No fevers or chills. He is drinking plenty of fluids. His appetite is okay. He is otherwise feeling well and at his baseline.     They give him miralax daily and docusate twice daily which he has been getting.     Given it appears he is no distress and otherwise doing well it is unlikely he has a bowel obstruction. I gave verbal order to use miralax up to twice a day PRN and also gave verbal order to give Senna 8.6 mg BID as needed for constipation. Recommended to ensure he drinks plenty of fluids. Discussed precautions to be seen in the ED.     They will call back tomorrow if no improvement.     [place] Tan, PGY3  [facility] Medicine

## 2020-03-13 NOTE — TELEPHONE ENCOUNTER
Noted.    Miralax 17 gm qam and an additional 17gm dose daily prn constipation.  Colase 100mg BID    Jonathan Mack MD

## 2020-03-13 NOTE — TELEPHONE ENCOUNTER
Medication clarification.     Miralax q am plus one dose prn  Colace 100mg bid    Note routed to Dr. Jessica Dugan RN

## 2020-03-13 NOTE — TELEPHONE ENCOUNTER
[facility] phone call message- medication clarification/question:    Full Medication Name:     Bacitracin    Sodium Chloride Nebulizer 2x a day  Gabapentin PRN     Question:     Needing orders sent to [facility].     Pharmacy confirmed as      [facility] - [place] - [address]   Yes    OK to leave a message on voice mail? Yes    Primary language: English      needed? No    Call taken on March 13, 2020 at 10:02 AM by Steffanie Long CMA

## 2020-03-13 NOTE — TELEPHONE ENCOUNTER
[facility] phone call message- general phone call:    Reason for call: She needs to clarify some orders he hasn't been having BM for the last 3 days they got orders to increase the Murelax however they wanted to know if they can keep the scheduled  Murelax at 9.    Action desired: call back.    Return call needed: Yes    OK to leave a message on voice mail? Yes    Advised patient to response may take up to 2 business days: Yes    Primary language: English      needed? No    Call taken on March 13, 2020 at 8:48 AM by Latricia Mercado

## 2020-03-18 NOTE — TELEPHONE ENCOUNTER
"Pharmacy note, \"Our claims data indicates that your patient Vlad Gleason's prescription benefit covers 90-day supplies at retail pharmacies with a copay that is equal or less than three individual 30-day supplies but they currently receive a 30-day supply of their medications.\"    "

## 2020-03-31 NOTE — PROGRESS NOTES
To be completed in Nursing note:    Please reference list for forms that require a visit for completion.  Please remind patients that providers are given 3-5 business days to complete and return forms.      Form type: Home Health Certification and Plan of Care     Date form received: 3/29/2020    Date form completed by Physician: 3/31/2020    How was form returned to patient (mailed, faxed, or at  for patient to ):    Fax to [phone] ATTN: Rupal Kwong RN     Date form mailed/faxed/left at  for patient and sent to HIM for scanning:    Fax on 4/1/2020    Once form is left for patient, faxed, or mailed PCS will then close the documentation only encounter.

## 2020-04-02 NOTE — PROGRESS NOTES
Care facility notified that a telephone visit with pt's nurse has been scheduled for 4/16/20 due to covid-19 restrictions.    btrn

## 2020-04-03 NOTE — PROGRESS NOTES
To be completed in Nursing note:    Please reference list for forms that require a visit for completion.  Please remind patients that providers are given 3-5 business days to complete and return forms.      Form type:[facility] for wound therapy supply and air mattress    Date form received: 4/3/20    Date form completed by Physician:20    How was form returned to patient (mailed, faxed, or at  for patient to ): fax to [phone] attn: haresh Bautista    Date form mailed/faxed/left at  for patient and sent to HIM for scannin/3/20      Once form is left for patient, faxed, or mailed PCS will then close the documentation only encounter.

## 2020-04-14 NOTE — TELEPHONE ENCOUNTER
RN recommending silver nitrate applicator for wound around the trach. Doxycycline was prescribed but it doesn't seem to be helping.   Routed to Dr. Mack. /MONICA Salazar     Refill completed.

## 2020-04-16 PROBLEM — I48.0 PAROXYSMAL ATRIAL FIBRILLATION (H): Status: RESOLVED | Noted: 2017-12-20 | Resolved: 2020-01-01

## 2020-04-16 NOTE — PROGRESS NOTES
"Family Medicine Telephone Visit Note               Telephone Visit Consent   Patient was verbally read the following and verbal consent was obtained.    \"This telephone visit will be conducted via a call between you and your physician/provider. We have found that certain health care needs can be provided without the need for a physical exam.  This service lets us provide the care you need with a short phone conversation.  If a prescription is necessary we can send it directly to your pharmacy.  If lab work is needed we can place an order for that and you can then stop by our lab to have the test done at a later time.    Telephone visits are billed at different rates depending on your insurance coverage. During this emergency period, for some insurers they may be billed the same as an in-person visit.  Please reach out to your insurance provider with any questions.    If during the course of the call the physician/provider feels a telephone visit is not appropriate, you will not be charged for this service.\"    Name person giving consent:  Patient and caregiver   Date verbal consent given:  4/16/2020  Time verbal consent given:  9:08 AM           Chief Complaint   Patient presents with     Home Care/Hospice     dicuss home care visits                HPI   Patients name: Vlad  Appointment start time:  9:08am    15 minute telephone visit.  60 day evaluation.  Stable. Ongoing Shoulder pains. Hurts at rest and with movement. Tylenol helps (receives TID scheduled with an occasional prn)  Had Xray earlier this winter; no fracture.  Sleep pattern stable. Does not get out of bed. Rolls for cares. Naps. Has suprapublic cath for urine. Cloudy/odor on and off but no systemic Sx.  Wounds stable. Wound nurse does come out for visits. Has a irritated\"?mole\" on face, which he picks at.  Currently Full code. We discussed treatment plan options, should he get sice. I reviewed option of keeping him comfortable at the [facility] " with thr treatments we could do their vs. Transport via EMS to the hospital for more advanced interventions. Potential for suffering reviewed. Vlad sated he will think it over, discuss with his nurses and let me know if he has further questions or would like a change in his code status.      Current Outpatient Medications   Medication Sig Dispense Refill     acetaminophen (TYLENOL) 32 mg/mL solution Take 20.3 mLs (650 mg) by mouth every 4 hours as needed for fever or mild pain 120 mL 0     albuterol (2.5 MG/3ML) 0.083% neb solution Nebulize the contents of 1 vial twice daily and every 4 hours prn 25 vial 0     ascorbic acid (VITAMIN C) 500 MG tablet Take 1 tablet (500 mg) by mouth daily 30 tablet      aspirin 81 MG chewable tablet Take 1 tablet (81 mg) by mouth daily 108 tablet 3     bacitracin 500 UNIT/GM external ointment Apply topically 2 times daily as needed for wound care 7 g 11     blood glucose (NO BRAND SPECIFIED) lancets standard Use to test blood sugar 3 times daily or as directed. 100 each 11     blood glucose (NO BRAND SPECIFIED) lancets standard Use to test blood sugar 1 times daily or as directed. 100 each 3     blood glucose (NO BRAND SPECIFIED) test strip Use to test blood sugar 1 times daily or as directed. 100 each 3     blood glucose monitoring (NO BRAND SPECIFIED) meter device kit Use to test blood sugar 1 times daily or as directed. 1 kit 0     brimonidine (ALPHAGAN) 0.2 % ophthalmic solution Place 1 drop into both eyes daily 1 Bottle 11     carvedilol (COREG) 3.125 MG tablet Take 1 tablet (3.125 mg) by mouth 2 times daily (with meals) Hold for HR <60 bpm. 60 tablet 1     diphenhydrAMINE (BENADRYL) 25 MG tablet Take 1 tablet (25 mg) by mouth 4 times daily as needed for itching or other (anxiety) 30 tablet 3     diphenhydrAMINE (BENADRYL) 25 MG tablet Take 1-2 tablets (25-50 mg) by mouth every 6 hours as needed for itching or allergies 60 tablet 1     Docusate Sodium (DIOCTO) 150 MG/15ML LIQD  Take 100 mg by mouth 2 times daily 300 mL      docusate sodium (ENEMEEZ) 283 MG enema Place 1 enema rectally daily as needed for constipation 30 enema 4     EPINEPHrine PF (ADRENALIN) 1 MG/ML injection Inject 0.3 mLs (0.3 mg) Subcutaneous once 0.3 mL 0     ferrous sulfate 220 (44 FE) MG/5ML ELIX Take 6.82 mLs (300 mg) by mouth At Bedtime 30 mL      fluticasone (ARNUITY ELLIPTA) 100 MCG/ACT inhaler Inhale 1 puff into the lungs daily 30 each 11     fluticasone (FLONASE) 50 MCG/ACT spray Spray 1 spray into both nostrils daily 1 Bottle 11     gabapentin (NEURONTIN) 300 MG capsule Take 1 capsule (300 mg) by mouth daily as needed for other (wound dressing change) 30 capsule 11     gabapentin (NEURONTIN) 300 MG capsule Take 300mg each morning and 600mg each evening 90 capsule 3     ipratropium - albuterol 0.5 mg/2.5 mg/3 mL (DUONEB) 0.5-2.5 (3) MG/3ML neb solution Take 1 vial (3 mLs) by nebulization 2 times daily 60 vial 11     loratadine (CLARITIN) 10 MG tablet Take 1 tablet (10 mg) by mouth daily 30 tablet 1     melatonin 3 MG tablet Take 1 tablet (3 mg) by mouth At Bedtime       metFORMIN (GLUCOPHAGE) 500 MG tablet Take 1 tablet (500 mg) by mouth 2 times daily (with meals) 120 tablet 11     multivitamin, therapeutic (THERA-VIT) TABS tablet Take 1 tablet by mouth daily  0     nitroGLYcerin (NITROSTAT) 0.4 MG sublingual tablet For chest pain place 1 tablet under the tongue every 5 minutes for 3 doses. If symptoms persist 5 minutes after 1st dose call 911. 25 tablet 0     nystatin (MYCOSTATIN) 245021 UNIT/GM external cream Apply topically daily and TID PRN skin irritation 30 g 11     nystatin (MYCOSTATIN) 353578 UNIT/GM external powder Apply topically 2 times daily as needed for other (moisture or erythema in skin folds) 60 g 11     olopatadine (PATANOL) 0.1 % ophthalmic solution Place 1 drop into both eyes daily 5 mL 3     omeprazole (PRILOSEC) 2 mg/mL SUSP Take 10 mLs (20 mg) by mouth daily       order for DME Equipment  "being ordered: Glucometer, lancets and supplies.  Test strips \"test once time daily\" #qs one year 1 Device 0     order for DME Equipment being ordered: Bariatric Group 2 matrices     Face-To-Face: Dr. Meghana Lamb on 5/7/19    Con 1 each 0     order for DME Equipment being ordered: Bariatric Group 2 Mattress    Face-to-Face: Dr. Meghana Lamb on 5/7/19 1 each 0     order for DME Equipment being ordered: Hospital Bed; Bariatic 1 Device 0     order for DME Equipment being ordered: Suprapubic catheter supplies  20 french 1 Units 0     order for DME Equipment being ordered: tracheostomy supplies for q2 weeks changes. 2 catheter 11     order for DME Equipment being ordered: Suprapubic catheter supplies for q2 week changes. 2 Package 11     order for DME Equipment being ordered: 18 gauge French G tube. Change q 3 months and prn.  #q.s. One year 1 Box 11     oxybutynin (DITROPAN XL) 10 MG 24 hr tablet Take 1 tablet (10 mg) by mouth daily 30 tablet 1     polyethylene glycol 3350 POWD 17 g by Per G Tube route daily       QUEtiapine (SEROQUEL) 100 MG tablet Take 1 tablet (100 mg) by mouth every morning AND 2 tablets (200 mg) At Bedtime. 90 tablet 11     Silver Nitrate 10 % OINT Externally apply topically 2 times daily as needed 30 g 0     sodium chloride (NEBUSAL) 3 % neb solution Take 3 mLs by nebulization 2 times daily 15 mL 11     terbinafine (LAMISIL AT) 1 % external cream Apply topically 2 times daily as needed (rash) Neck around trach. 30 g 3     traZODone (DESYREL) 150 MG tablet Take 1 tablet (150 mg) by mouth At Bedtime 90 tablet 3     venlafaxine (EFFEXOR) 75 MG tablet Take 100 mg by mouth 2 times daily       Allergies   Allergen Reactions     Bactrim [Sulfamethoxazole W/Trimethoprim]      Bee Venom      Fish               Review of Systems:     ROS COMP: Constitutional, HEENT, cardiovascular, pulmonary, gi and gu systems are negative, except as otherwise noted.         Physical Exam:     There were no vitals taken " for this visit.  There is no height or weight on file to calculate BMI.    Exam:  Constitutional: healthy, alert and no distress  Psychiatric: mentation appears normal, affect flat and speech lower volume but understandable    Diagnostic Test Results:  Labs reviewed in Epic  Xray - Shoulder: NO fracture        Assessment and Plan   1. Major depressive disorder with psychotic features (H)  Stable    2. Type 2 diabetes mellitus with complication, without long-term current use of insulin (H)  Stable. On metformin    3. Chronic respiratory failure with hypoxia and hypercapnia (H)  Stable. High COVID risk    4. Obesity hypoventilation syndrome (H)        Refilled medications that would be required in the next 3 months.     After Visit Information:  Patient declined AVS     No follow-ups on file.    Appointment end time: 9:23am  This is a telephone visit that took 15 minutes.      Clinician location:  [facility]    Jonathan Mack MD

## 2020-04-16 NOTE — PATIENT INSTRUCTIONS
"15 minute telephone visit.  60 day evaluation.  Stable. Ongoing Shoulder pains. Hurts at rest and with movement. Tylenol helps (receives TID scheduled with an occasional prn)  Had Xray earlier this winter; no fracture.  Sleep pattern stable. Does not get out of bed. Rolls for cares. Naps. Has suprapublic cath for urine. Cloudy/odor on and off but no systemic Sx.  Wounds stable. Wound nurse does come out for visits. Has a irritated\"?mole\" on face, which he picks at.  Currently Full code. We discussed treatment plan options, should he get sice. I reviewed option of keeping him comfortable at the [facility] with thr treatments we could do their vs. Transport via EMS to the hospital for more advanced interventions. Potential for suffering reviewed. Vlad sated he will think it over, discuss with his nurses and let me know if he has further questions or would like a change in his code status.  "

## 2020-04-24 NOTE — TELEPHONE ENCOUNTER
[facility] phone call message- general phone call:    Reason for call: She needs clarification on order for his gabapentin 600 mg daily and 600 mg at night, they tried to refill at the pharmacy but they said they don't have those order there    Action desired: call back.    Return call needed: Yes    OK to leave a message on voice mail? Yes    Advised patient to response may take up to 2 business days: Yes    Primary language: English      needed? No    Call taken on April 24, 2020 at 8:18 AM by Latricia Mercado

## 2020-05-08 NOTE — TELEPHONE ENCOUNTER
[facility] phone call message - order or referral request for patient:     Order or referral being requested: med orders       Additional Comments: hydrocortisone 2.5 % cream applied 2 times a day for itching      OK to leave a message on voice mail? Yes    Primary language: English      needed? No    Call taken on May 8, 2020 at 9:44 AM by Pierre Dukes

## 2020-05-14 NOTE — PROGRESS NOTES
To be completed in Nursing note:    Please reference list for forms that require a visit for completion.  Please remind patients that providers are given 3-5 business days to complete and return forms.      Form type:  [facility] Services / Consulting Documentation      Date form received:  2020    Date form completed by Physician:  05/15/2020    How was form returned to patient (mailed, faxed, or at  for patient to ):  Will fax to [phone]    Date form mailed/faxed/left at  for patient and sent to HIM for scannin/15/2020         Once form is left for patient, faxed, or mailed PCS will then close the documentation only encounter.

## 2020-05-26 NOTE — PROGRESS NOTES
To be completed in Nursing note:    Please reference list for forms that require a visit for completion.  Please remind patients that providers are given 3-5 business days to complete and return forms.      Form type:  [facility] / Consulting Documentation     Date form received:  2020    Date form completed by Physician:  2020    How was form returned to patient (mailed, faxed, or at  for patient to ):  Fax to [phone]    Date form mailed/faxed/left at  for patient and sent to HIM for scannin2020         Once form is left for patient, faxed, or mailed PCS will then close the documentation only encounter.

## 2020-05-27 NOTE — TELEPHONE ENCOUNTER
[facility] phone call message- patient requesting a refill:    Full Medication Name: ferrous sulfate 220 (44 FE) MG/5ML ELIX    Dose: Take 6.82 mLs (300 mg) by mouth At Bedtime     Pharmacy confirmed as    [facility] - [place], [place] - [address]  [facility] [address]  Phone: [phone] Fax: [phone]  : Yes    Additional Comments: They also need a new order for the home. Please fax to [phone]    OK to leave a message on voice mail? Yes    Primary language: English      needed? No    Call taken on May 27, 2020 at 4:08 PM by Jocy Borges

## 2020-06-02 NOTE — PROGRESS NOTES
To be completed in Nursing note:    Please reference list for forms that require a visit for completion.  Please remind patients that providers are given 3-5 business days to complete and return forms.      Form type:  [facility] / Home Health Certification and Plan of Care     Date form received:  2020    Date form completed by Physician:  2020    How was form returned to patient (mailed, faxed, or at  for patient to ):  Fax to [phone]    Date form mailed/faxed/left at  for patient and sent to HIM for scannin2020        Once form is left for patient, faxed, or mailed PCS will then close the documentation only encounter.

## 2020-06-23 NOTE — PROGRESS NOTES
To be completed in Nursing note:    Please reference list for forms that require a visit for completion.  Please remind patients that providers are given 3-5 business days to complete and return forms.      Form type:  [facility] / Consulting documentation and Physician's order     Date form received:  2020    Date form completed by Physician:  2020    How was form returned to patient (mailed, faxed, or at  for patient to ):  Will fax to [phone]    Date form mailed/faxed/left at  for patient and sent to HIM for scannin2020        Once form is left for patient, faxed, or mailed PCS will then close the documentation only encounter.

## 2020-07-07 NOTE — PROGRESS NOTES
Anila called in to make sure that #4 is filled out before this is faxed back. If you have any questions she can be reached at [phone]  /[facility]

## 2020-07-07 NOTE — PROGRESS NOTES
To be completed in Nursing note:    Please reference list for forms that require a visit for completion.  Please remind patients that providers are given 3-5 business days to complete and return forms.      Form type:  [facility] / Written Confirmation of Preliminary Order     Date form received:  2020    Date form completed by Physician:  2020     How was form returned to patient (mailed, faxed, or at  for patient to ):  Fax to [phone]    Date form mailed/faxed/left at  for patient and sent to HIM for [email or web address]            Once form is left for patient, faxed, or mailed PCS will then close the documentation only encounter.

## 2020-07-08 NOTE — PROGRESS NOTES
To be completed in Nursing note:    Please reference list for forms that require a visit for completion.  Please remind patients that providers are given 3-5 business days to complete and return forms.      Form type:  [facility] / Documentation for Medicare Part B claims processing.     Date form received:  2020    Date form completed by Physician:  2020      How was form returned to patient (mailed, faxed, or at  for patient to ):  Will fax to [phone]    Date form mailed/faxed/left at  for patient and sent to HIM for [email or web address]        Once form is left for patient, faxed, or mailed PCS will then close the documentation only encounter.

## 2020-07-16 NOTE — PROGRESS NOTES
To be completed in Nursing note:    Please reference list for forms that require a visit for completion.  Please remind patients that providers are given 3-5 business days to complete and return forms.      Form type:  [facility] / Consulting Documentation and Physician's Order     Date form received:  07/10/2020    Date form completed by Physician:  2020    How was form returned to patient (mailed, faxed, or at  for patient to ):  Will fax to [phone]    Date form mailed/faxed/left at  for patient and sent to HIM for [email or web address]        Once form is left for patient, faxed, or mailed PCS will then close the documentation only encounter.

## 2020-07-29 NOTE — PROGRESS NOTES
To be completed in Nursing note:    Please reference list for forms that require a visit for completion.  Please remind patients that providers are given 3-5 business days to complete and return forms.      Form type:  [facility] / Physician Order and Home Health Certification and Plan of Care     Date form received:  2020    Date form completed by Physician:  2020    How was form returned to patient (mailed, faxed, or at  for patient to ):  Will fax to [phone]    Date form mailed/faxed/left at  for patient and sent to HIM for [email or web address]          Once form is left for patient, faxed, or mailed PCS will then close the documentation only encounter.

## 2020-08-04 NOTE — PROGRESS NOTES
To be completed in Nursing note:    Please reference list for forms that require a visit for completion.  Please remind patients that providers are given 3-5 business days to complete and return forms.      Form type:  [facility] / Consulting Documentation.     Date form received:   2020    Date form completed by Physician:  2020    How was form returned to patient (mailed, faxed, or at  for patient to ):  Will fax to [phone]      Date form mailed/faxed/left at  for patient and sent to HIM for [email or web address]       Once form is left for patient, faxed, or mailed PCS will then close the documentation only encounter.

## 2020-08-19 NOTE — PROGRESS NOTES
To be completed in Nursing note:    Please reference list for forms that require a visit for completion.  Please remind patients that providers are given 3-5 business days to complete and return forms.      Form type:  [facility] / Physician Orders     Date form received:  2020    Date form completed by Physician:  2020    How was form returned to patient (mailed, faxed, or at  for patient to ):  Fax to [phone]    Date form mailed/faxed/left at  for patient and sent to HIM for [email or web address]      Once form is left for patient, faxed, or mailed PCS will then close the documentation only encounter.

## 2020-08-19 NOTE — PROGRESS NOTES
To be completed in Nursing note:    Please reference list for forms that require a visit for completion.  Please remind patients that providers are given 3-5 business days to complete and return forms.      Form type:  [facility] / Consulting Documentation    Date form received:  2020    Date form completed by Physician:  2020    How was form returned to patient (mailed, faxed, or at  for patient to ):  Fax to [phone]    Date form mailed/faxed/left at  for patient and sent to HIM for [email or web address]         Once form is left for patient, faxed, or mailed PCS will then close the documentation only encounter.

## 2020-08-19 NOTE — TELEPHONE ENCOUNTER
Video visit scheduled for 8/20/20 10:30  Rekha ANDERSON notified  Link to be sent to [phone]    [facility]

## 2020-08-21 NOTE — PROGRESS NOTES
Preceptor Attestation:   Patient seen and evaluated via video visit. I discussed the patient with the resident. I have verified the content of the note, which accurately reflects my assessment of the patient and the plan of care.   Supervising Physician:  Jonathan Mack MD.

## 2020-08-21 NOTE — PROGRESS NOTES
"Family Medicine Telephone Visit Note         Telephone Visit Consent   Patient was verbally read the following and verbal consent was obtained.    \"Telephone visits are billed at different rates depending on your insurance coverage. During this emergency period, for some insurers they may be billed the same as an in-person visit.  Please reach out to your insurance provider with any questions.  If during the course of the call the physician/provider feels a telephone visit is not appropriate, you will not be charged for this service.\"    Name person giving consent:  Patient   Date verbal consent given:  8/2102020  Time verbal consent given:  10:48 AM    No chief complaint on file.         HPI   Patients name: Vlad  Appointment start time:  10:50 AM    Vlad Gleason is a 69 year old male with a history of:  Patient Active Problem List   Diagnosis     Chronic respiratory failure with hypoxia and hypercapnia (H)     Obesity hypoventilation syndrome (H)     Dysphagia     Urinary retention     Sacral wound     Major depressive disorder with psychotic features (H)     Surgical wound, non healing     Primary insomnia     Type 2 diabetes mellitus with complication, without long-term current use of insulin (H)     Infection due to fungus       ***    Current Outpatient Medications   Medication Sig Dispense Refill     acetaminophen (TYLENOL) 32 mg/mL solution Take 20.3 mLs (650 mg) by mouth every 4 hours as needed for fever or mild pain 120 mL 0     albuterol (2.5 MG/3ML) 0.083% neb solution Nebulize the contents of 1 vial twice daily and every 4 hours prn 25 vial 0     ascorbic acid (VITAMIN C) 500 MG tablet Take 1 tablet (500 mg) by mouth daily 30 tablet      aspirin 81 MG chewable tablet Take 1 tablet (81 mg) by mouth daily 108 tablet 3     bacitracin 500 UNIT/GM external ointment Apply topically 2 times daily as needed for wound care 7 g 11     blood glucose (NO BRAND SPECIFIED) lancets standard Use to test blood " sugar 3 times daily or as directed. 100 each 11     blood glucose (NO BRAND SPECIFIED) lancets standard Use to test blood sugar 1 times daily or as directed. 100 each 3     blood glucose (NO BRAND SPECIFIED) test strip Use to test blood sugar 1 times daily or as directed. 100 each 3     blood glucose monitoring (ACCU-CHEK AMBER PLUS) meter device kit TEST ONCE DAILY 1 kit 0     blood glucose monitoring (NO BRAND SPECIFIED) meter device kit Use to test blood sugar 1 times daily or as directed. 1 kit 0     brimonidine (ALPHAGAN) 0.2 % ophthalmic solution Place 1 drop into both eyes daily 1 Bottle 11     carvedilol (COREG) 3.125 MG tablet Take 1 tablet (3.125 mg) by mouth 2 times daily (with meals) Hold for HR <60 bpm. 60 tablet 1     diphenhydrAMINE (BENADRYL) 25 MG tablet Take 1 tablet (25 mg) by mouth 4 times daily as needed for itching or other (anxiety) 30 tablet 3     diphenhydrAMINE (BENADRYL) 25 MG tablet Take 1-2 tablets (25-50 mg) by mouth every 6 hours as needed for itching or allergies 60 tablet 1     Docusate Sodium (DIOCTO) 150 MG/15ML LIQD Take 100 mg by mouth 2 times daily 300 mL      docusate sodium (ENEMEEZ) 283 MG enema Place 1 enema rectally daily as needed for constipation 30 enema 4     EPINEPHrine PF (ADRENALIN) 1 MG/ML injection Inject 0.3 mLs (0.3 mg) Subcutaneous once 0.3 mL 0     ferrous sulfate 220 (44 Fe) MG/5ML ELIX Take 6.82 mLs (300 mg) by mouth At Bedtime 473 mL 5     fluticasone (ARNUITY ELLIPTA) 100 MCG/ACT inhaler Inhale 1 puff into the lungs daily 30 each 11     fluticasone (FLONASE) 50 MCG/ACT spray Spray 1 spray into both nostrils daily 1 Bottle 11     gabapentin (NEURONTIN) 300 MG capsule Take 1 capsule (300 mg) by mouth daily as needed for other (wound dressing change) 30 capsule 11     gabapentin (NEURONTIN) 300 MG capsule Take 300mg each morning and 600mg each evening 90 capsule 3     hydrocortisone 2.5 % cream Apply topically 2 times daily as needed itch 30 g 1      "ipratropium - albuterol 0.5 mg/2.5 mg/3 mL (DUONEB) 0.5-2.5 (3) MG/3ML neb solution Take 1 vial (3 mLs) by nebulization 2 times daily 60 vial 11     loratadine (CLARITIN) 10 MG tablet Take 1 tablet (10 mg) by mouth daily 30 tablet 1     melatonin 3 MG tablet Take 1 tablet (3 mg) by mouth At Bedtime       metFORMIN (GLUCOPHAGE) 500 MG tablet Take 1 tablet (500 mg) by mouth 2 times daily (with meals) 120 tablet 11     multivitamin, therapeutic (THERA-VIT) TABS tablet Take 1 tablet by mouth daily  0     nitroGLYcerin (NITROSTAT) 0.4 MG sublingual tablet For chest pain place 1 tablet under the tongue every 5 minutes for 3 doses. If symptoms persist 5 minutes after 1st dose call 911. 25 tablet 0     nystatin (MYCOSTATIN) 465559 UNIT/GM external cream Apply topically daily and TID PRN skin irritation 30 g 11     nystatin (MYCOSTATIN) 357073 UNIT/GM external powder Apply topically 2 times daily as needed for other (moisture or erythema in skin folds) 60 g 11     olopatadine (PATANOL) 0.1 % ophthalmic solution Place 1 drop into both eyes daily 5 mL 3     omeprazole (PRILOSEC) 2 mg/mL SUSP Take 10 mLs (20 mg) by mouth daily       order for DME Equipment being ordered: Glucometer, lancets and supplies.  Test strips \"test once time daily\" #qs one year 1 Device 0     order for DME Equipment being ordered: Bariatric Group 2 matrices     Face-To-Face: Dr. Meghana Lamb on 5/7/19    Con 1 each 0     order for DME Equipment being ordered: Bariatric Group 2 Mattress    Face-to-Face: Dr. Meghana Lamb on 5/7/19 1 each 0     order for DME Equipment being ordered: Hospital Bed; Bariatic 1 Device 0     order for DME Equipment being ordered: Suprapubic catheter supplies  20 french 1 Units 0     order for DME Equipment being ordered: tracheostomy supplies for q2 weeks changes. 2 catheter 11     order for DME Equipment being ordered: Suprapubic catheter supplies for q2 week changes. 2 Package 11     order for DME Equipment being ordered: " 18 gauge French G tube. Change q 3 months and prn.  #q.s. One year 1 Box 11     oxybutynin (DITROPAN XL) 10 MG 24 hr tablet Take 1 tablet (10 mg) by mouth daily 30 tablet 1     polyethylene glycol 3350 POWD 17 g by Per G Tube route daily       QUEtiapine (SEROQUEL) 100 MG tablet Take 1 tablet (100 mg) by mouth every morning AND 2 tablets (200 mg) At Bedtime. 90 tablet 11     Silver Nitrate 10 % OINT Externally apply topically 2 times daily as needed 30 g 0     sodium chloride (NEBUSAL) 3 % neb solution Take 3 mLs by nebulization 2 times daily 15 mL 11     terbinafine (LAMISIL AT) 1 % external cream Apply topically 2 times daily as needed (rash) Neck around trach. 30 g 3     traZODone (DESYREL) 150 MG tablet Take 1 tablet (150 mg) by mouth At Bedtime 90 tablet 3     venlafaxine (EFFEXOR) 75 MG tablet Take 100 mg by mouth 2 times daily       Allergies   Allergen Reactions     Bactrim [Sulfamethoxazole W/Trimethoprim]      Bee Venom      Fish           Review of Systems:   Constitutional, HEENT, cardiovascular, pulmonary, gi and gu systems are negative, except as otherwise noted.         Physical Exam:     There were no vitals taken for this visit.  There is no height or weight on file to calculate BMI.    Exam:  Constitutional: healthy, alert and no distress  Psychiatric: mentation appears normal and affect normal/bright    {Result Choices:333377}        Assessment and Plan   Diagnoses and all orders for this visit:    Type 2 diabetes mellitus with complication, without long-term current use of insulin (H)    Non-healing surgical wound, sequela    Chronic respiratory failure with hypoxia and hypercapnia (H)    Obesity hypoventilation syndrome (H)    Tracheostomy in place (H)    Body mass index (BMI) of 50-59.9 in adult (H)    Bilateral ureteral calculi  Comments:  Follows with urology. CT 7/2020 show bilateral non-obstructing 7mm stones slightly increased in size.        Refilled medications that would be required in  the next 3 months.     After Visit Information:  Patient declined AVS     No follow-ups on file.    Appointment end time: { :[id number]}  This is a telephone visit that took *** minutes.    Clinician location:  [facility]     Elen Santoro MD PGY3  I precepted today with Dr. Mack.    {Bill telephone visit time codes. Coding will change to an E&M code if the patient's insurance allows for this.  However, documentation should support E&M code billing.  16911: 5-10 min  23721: 11-20 min  83436: 21-30 min}

## 2020-08-21 NOTE — PROGRESS NOTES
"Family Medicine Video Visit Note  Vlad is being evaluated via a billable video visit.           Video Visit Consent     Patient was verbally read the following and verbal consent was obtained.  \"Video visits are billed at different rates depending on your insurance coverage. During this emergency period, for some insurers they may be billed the same as an in-person visit.  Please reach out to your insurance provider with any questions.  If during the course of the call the physician/provider feels a video visit is not appropriate, you will not be charged for this service.\"     (Name person giving consent:  Patient   Date verbal consent given:  8/20/20  Time verbal consent given:  10:48 AM)    Patient would like the video invitation sent by: Text to cell phone: [phone]    No chief complaint on file.         HPI   Video Start Time: 10:50 AM    Vlad Gleason is a 69 year old male with a history of   Patient Active Problem List   Diagnosis     Chronic respiratory failure with hypoxia and hypercapnia (H)     Obesity hypoventilation syndrome (H)     Dysphagia     Urinary retention     Sacral wound     Major depressive disorder with psychotic features (H)     Surgical wound, non healing     Primary insomnia     Type 2 diabetes mellitus with complication, without long-term current use of insulin (H)     Infection due to fungus     Presenting for a video visit for a 60 day evaluation.    He reports intermittent sore throat and difficulty with speaking. His care staff states that they put the cuff up on his trach for 1 hour during the day and that he forgets that he can't speak when the cuff is up. This leads to a sore throat. They are providing frequent patient reminders regarding this, but he is forgetful. They do not feel like any additional intervention needs to be taken at this time.    His blood glucose values have consistently been below 200. They are not always fasting due to the timing of his tube " feedings. Staff will fax the log to our office.    He was evaluated by urology for kidney stones on 7/24/2020. He denies any ongoing symptoms and will follow-up with them as recommended.    Discussed code status. He prefers to be transported to the hospital if he were to get ill.     Current Outpatient Medications   Medication Sig Dispense Refill     acetaminophen (TYLENOL) 32 mg/mL solution Take 20.3 mLs (650 mg) by mouth every 4 hours as needed for fever or mild pain 120 mL 0     albuterol (2.5 MG/3ML) 0.083% neb solution Nebulize the contents of 1 vial twice daily and every 4 hours prn 25 vial 0     ascorbic acid (VITAMIN C) 500 MG tablet Take 1 tablet (500 mg) by mouth daily 30 tablet      aspirin 81 MG chewable tablet Take 1 tablet (81 mg) by mouth daily 108 tablet 3     bacitracin 500 UNIT/GM external ointment Apply topically 2 times daily as needed for wound care 7 g 11     blood glucose (NO BRAND SPECIFIED) lancets standard Use to test blood sugar 3 times daily or as directed. 100 each 11     blood glucose (NO BRAND SPECIFIED) lancets standard Use to test blood sugar 1 times daily or as directed. 100 each 3     blood glucose (NO BRAND SPECIFIED) test strip Use to test blood sugar 1 times daily or as directed. 100 each 3     blood glucose monitoring (ACCU-CHEK AMBER PLUS) meter device kit TEST ONCE DAILY 1 kit 0     blood glucose monitoring (NO BRAND SPECIFIED) meter device kit Use to test blood sugar 1 times daily or as directed. 1 kit 0     brimonidine (ALPHAGAN) 0.2 % ophthalmic solution Place 1 drop into both eyes daily 1 Bottle 11     carvedilol (COREG) 3.125 MG tablet Take 1 tablet (3.125 mg) by mouth 2 times daily (with meals) Hold for HR <60 bpm. 60 tablet 1     diphenhydrAMINE (BENADRYL) 25 MG tablet Take 1 tablet (25 mg) by mouth 4 times daily as needed for itching or other (anxiety) 30 tablet 3     diphenhydrAMINE (BENADRYL) 25 MG tablet Take 1-2 tablets (25-50 mg) by mouth every 6 hours as needed  for itching or allergies 60 tablet 1     Docusate Sodium (DIOCTO) 150 MG/15ML LIQD Take 100 mg by mouth 2 times daily 300 mL      docusate sodium (ENEMEEZ) 283 MG enema Place 1 enema rectally daily as needed for constipation 30 enema 4     EPINEPHrine PF (ADRENALIN) 1 MG/ML injection Inject 0.3 mLs (0.3 mg) Subcutaneous once 0.3 mL 0     ferrous sulfate 220 (44 Fe) MG/5ML ELIX Take 6.82 mLs (300 mg) by mouth At Bedtime 473 mL 5     fluticasone (ARNUITY ELLIPTA) 100 MCG/ACT inhaler Inhale 1 puff into the lungs daily 30 each 11     fluticasone (FLONASE) 50 MCG/ACT spray Spray 1 spray into both nostrils daily 1 Bottle 11     gabapentin (NEURONTIN) 300 MG capsule Take 1 capsule (300 mg) by mouth daily as needed for other (wound dressing change) 30 capsule 11     gabapentin (NEURONTIN) 300 MG capsule Take 300mg each morning and 600mg each evening 90 capsule 3     hydrocortisone 2.5 % cream Apply topically 2 times daily as needed itch 30 g 1     ipratropium - albuterol 0.5 mg/2.5 mg/3 mL (DUONEB) 0.5-2.5 (3) MG/3ML neb solution Take 1 vial (3 mLs) by nebulization 2 times daily 60 vial 11     loratadine (CLARITIN) 10 MG tablet Take 1 tablet (10 mg) by mouth daily 30 tablet 1     melatonin 3 MG tablet Take 1 tablet (3 mg) by mouth At Bedtime       metFORMIN (GLUCOPHAGE) 500 MG tablet Take 1 tablet (500 mg) by mouth 2 times daily (with meals) 120 tablet 11     multivitamin, therapeutic (THERA-VIT) TABS tablet Take 1 tablet by mouth daily  0     nitroGLYcerin (NITROSTAT) 0.4 MG sublingual tablet For chest pain place 1 tablet under the tongue every 5 minutes for 3 doses. If symptoms persist 5 minutes after 1st dose call 911. 25 tablet 0     nystatin (MYCOSTATIN) 138796 UNIT/GM external cream Apply topically daily and TID PRN skin irritation 30 g 11     nystatin (MYCOSTATIN) 240516 UNIT/GM external powder Apply topically 2 times daily as needed for other (moisture or erythema in skin folds) 60 g 11     olopatadine (PATANOL) 0.1  "% ophthalmic solution Place 1 drop into both eyes daily 5 mL 3     omeprazole (PRILOSEC) 2 mg/mL SUSP Take 10 mLs (20 mg) by mouth daily       order for DME Equipment being ordered: Glucometer, lancets and supplies.  Test strips \"test once time daily\" #qs one year 1 Device 0     order for DME Equipment being ordered: Bariatric Group 2 matrices     Face-To-Face: Dr. Meghana Lamb on 5/7/19    Con 1 each 0     order for DME Equipment being ordered: Bariatric Group 2 Mattress    Face-to-Face: Dr. Meghana Lamb on 5/7/19 1 each 0     order for DME Equipment being ordered: Hospital Bed; Bariatic 1 Device 0     order for DME Equipment being ordered: Suprapubic catheter supplies  20 french 1 Units 0     order for DME Equipment being ordered: tracheostomy supplies for q2 weeks changes. 2 catheter 11     order for DME Equipment being ordered: Suprapubic catheter supplies for q2 week changes. 2 Package 11     order for DME Equipment being ordered: 18 gauge French G tube. Change q 3 months and prn.  #q.s. One year 1 Box 11     oxybutynin (DITROPAN XL) 10 MG 24 hr tablet Take 1 tablet (10 mg) by mouth daily 30 tablet 1     polyethylene glycol 3350 POWD 17 g by Per G Tube route daily       QUEtiapine (SEROQUEL) 100 MG tablet Take 1 tablet (100 mg) by mouth every morning AND 2 tablets (200 mg) At Bedtime. 90 tablet 11     Silver Nitrate 10 % OINT Externally apply topically 2 times daily as needed 30 g 0     sodium chloride (NEBUSAL) 3 % neb solution Take 3 mLs by nebulization 2 times daily 15 mL 11     terbinafine (LAMISIL AT) 1 % external cream Apply topically 2 times daily as needed (rash) Neck around trach. 30 g 3     traZODone (DESYREL) 150 MG tablet Take 1 tablet (150 mg) by mouth At Bedtime 90 tablet 3     venlafaxine (EFFEXOR) 75 MG tablet Take 100 mg by mouth 2 times daily       Allergies   Allergen Reactions     Bactrim [Sulfamethoxazole W/Trimethoprim]      Bee Venom      Fish           Review of Systems: "   Constitutional, HEENT, cardiovascular, pulmonary, gi and gu systems are negative, except as otherwise noted.         Physical Exam:   There were no vitals taken for this visit.  There is no height or weight on file to calculate BMI.    GENERAL: Healthy, no acute distress  EYES: Eyes grossly normal to inspection.  No discharge or erythema, or obvious scleral/conjunctival abnormalities.  RESP: Trach in place. No audible wheeze, cough, or visible cyanosis.  No visible retractions or increased work of breathing.    SKIN: Visible skin clear. No significant rash, abnormal pigmentation or lesions.  NEURO: Cranial nerves grossly intact.  Mentation and speech at baseline.  PSYCH: Affect normal/bright.        Assessment and Plan   Diagnoses and all orders for this visit:    Type 2 diabetes mellitus with complication, without long-term current use of insulin (H)  Stable. On metformin.     Non-healing surgical wound, sequela  Stable. Is seen by wound care.    Chronic respiratory failure with hypoxia and hypercapnia (H)  Obesity hypoventilation syndrome (H)  Tracheostomy in place (H)  Body mass index (BMI) of 50-59.9 in adult (H)  Stable. High COVID-19 risk.    Bilateral ureteral calculi  Comments:  Follows with urology. CT 7/2020 show bilateral non-obstructing 7mm stones slightly increased in size.  No acute concerns. Recommended follow-up in 4 months.    Refilled medications that would be required in the next 3 months.     After Visit Information:  Patient declined AVS     No follow-ups on file.    Video-Visit Details    Type of service:  Video Visit    Video End Time (time video stopped): 11:10 AM    Originating Location (pt. Location): [facility] Care    Distant Location (provider location):  [facility]     Platform used for Video Visit: Carine Santoro MD PGY3  I precepted today with Dr. Mack

## 2020-08-25 NOTE — TELEPHONE ENCOUNTER
[facility] reports patient was in ER with hypoxia he was discharged with new orders.. Nurse calling to see if PCP is agreeable to new orders:    accu checks ac and hs     cavilon moisturizer cream to the edges of his wounds  Continue previous wound care this is an addition.     Note routed to Dr.Berg Dugan RN

## 2020-08-25 NOTE — TELEPHONE ENCOUNTER
[facility] phone call message- general phone call:    Reason for call:     Needing to speak with nurse. With pt now    Action desired:     talk    Return call needed: Yes    OK to leave a message on voice mail? Yes    Advised patient to response may take up to 2 business days: Yes    Primary language: English      needed? No    Call taken on August 25, 2020 at 10:23 AM by Steffanie Long CMA

## 2020-08-26 NOTE — TELEPHONE ENCOUNTER
Verbal order given to nurse for one time CO2 check at recommendation  From RT since pt CO2 level was elevated in hospital    Note routed to Dr.Berg Dugan RN

## 2020-08-26 NOTE — PROGRESS NOTES
To be completed in Nursing note:    Please reference list for forms that require a visit for completion.  Please remind patients that providers are given 3-5 business days to complete and return forms.      Form type: [facility], [facility] / "[employer] Orders for Co2     Date form received: 2020    Date form completed by Physician: 2020    How was form returned to patient (mailed, faxed, or at  for patient to ): Fax to: [phone]    Date form mailed/faxed/left at  for patient and sent to HIM for scannin2020        Once form is left for patient, faxed, or mailed PCS will then close the documentation only encounter.

## 2020-08-26 NOTE — TELEPHONE ENCOUNTER
[facility] phone call message - order or referral request for patient:     Order or referral being requested: the home care nurse called to ask for the Dr to order a dm monitor       Additional Comments:     OK to leave a message on voice mail? Yes    Primary language: English      needed? No    Call taken on August 26, 2020 at 11:18 AM by Pierre Dukes

## 2020-08-26 NOTE — TELEPHONE ENCOUNTER
[facility] phone call message- general phone call:    Reason for call: The home care nurse called to update the Dr and needs a call back    Return call needed: Yes    OK to leave a message on voice mail? Yes    Primary language: English      needed? No    Call taken on August 26, 2020 at 10:00 AM by Pierre Dukes

## 2020-08-26 NOTE — TELEPHONE ENCOUNTER
Nurse requesting order for glucose monitor and supplies be sent to [facility] on [address]  Checking twice daily    Done    Note routed to Dr.Berg Kailee ANDERSON

## 2020-09-03 NOTE — TELEPHONE ENCOUNTER
[facility] phone call message- general phone call:    Reason for call: They requested the pharmacy to be sent the CMN form but they still have not got anything and they are running out of stuff to check his Blood sugar.    Action desired: call back.    Return call needed: Yes    OK to leave a message on voice mail? Yes    Advised patient to response may take up to 2 business days: Yes    Primary language: English      needed? No    Call taken on September 3, 2020 at 9:30 AM by Latricia Mercado

## 2020-09-04 NOTE — PROGRESS NOTES
To be completed in Nursing note:    Please reference list for forms that require a visit for completion.  Please remind patients that providers are given 3-5 business days to complete and return forms.      Form type:  [facility] / [facility] and [facility] : Consulting Documentation and Physician Orders     Date form received:  2020 and 2020    Date form completed by Physician:  2020    How was form returned to patient (mailed, faxed, or at  for patient to ):  [phone], [phone], [phone], and [phone]    Date form mailed/faxed/left at  for patient and sent to HIM for scannin2020        Once form is left for patient, faxed, or mailed PCS will then close the documentation only encounter.

## 2020-09-04 NOTE — TELEPHONE ENCOUNTER
[facility] in [place] is calling. They keep getting prescriptions,but they are needing the CMN Form.     Callback [phone]

## 2020-09-15 NOTE — PROGRESS NOTES
To be completed in Nursing note:    Please reference list for forms that require a visit for completion.  Please remind patients that providers are given 3-5 business days to complete and return forms.      Form type:  [facility] / Consulting Documentation       Date form received:  2020    Date form completed by Physician:  2020    How was form returned to patient (mailed, faxed, or at  for patient to ):  fax to [phone]    Date form mailed/faxed/left at  for patient and sent to HIM for scannin2020             Once form is left for patient, faxed, or mailed PCS will then close the documentation only encounter.

## 2020-09-22 NOTE — TELEPHONE ENCOUNTER
YAZMINN requesting  script for benadryl  To be faxed to [facility] pharmacy and to [facility]     Script faxed as requesting    btrn

## 2020-09-22 NOTE — TELEPHONE ENCOUNTER
[facility] phone call message- general phone call:    Reason for call:     Nurse calling to speak with nurse in regards to pt's medication    Action desired:     talk    Return call needed: Yes    OK to leave a message on voice mail? Yes    Advised patient to response may take up to 2 business days: Yes    Primary language: English      needed? No    Call taken on September 22, 2020 at 3:37 PM by Steffanie Long CMA

## 2020-09-24 NOTE — PROGRESS NOTES
To be completed in Nursing note:    Please reference list for forms that require a visit for completion.  Please remind patients that providers are given 3-5 business days to complete and return forms.      Form type: Facsimile from [employer]    Date form received: 9/23/2020    Date form completed by Physician: 9/24/2020    How was form returned to patient (mailed, faxed, or at  for patient to ):    Fax to [phone]    Date form mailed/faxed/left at  for patient and sent to HIM for scanning:    Fax on 9/24/2020    Once form is left for patient, faxed, or mailed PCS will then close the documentation only encounter.

## 2020-09-24 NOTE — PROGRESS NOTES
To be completed in Nursing note:    Please reference list for forms that require a visit for completion.  Please remind patients that providers are given 3-5 business days to complete and return forms.      Form type: Home Health Certification and Plan of Care from [facility]    Date form received: 9/23/2020    Date form completed by Physician: 9/24/2020    How was form returned to patient (mailed, faxed, or at  for patient to ):    Fax to [phone]    Date form mailed/faxed/left at  for patient and sent to HIM for scanning:    Fax on 9/24/2020    Once form is left for patient, faxed, or mailed PCS will then close the documentation only encounter.

## 2020-10-05 NOTE — PROGRESS NOTES
To be completed in Nursing note:    Please reference list for forms that require a visit for completion.  Please remind patients that providers are given 3-5 business days to complete and return forms.      Form type:  [facility] / Physician Order    Date form received:  09/30/2020    Date form completed by Physician:  10/05/2020    How was form returned to patient (mailed, faxed, or at  for patient to ):  [phone]    Date form mailed/faxed/left at  for patient and sent to HIM for scanning:  10/05/2020        Once form is left for patient, faxed, or mailed PCS will then close the documentation only encounter.

## 2020-10-27 NOTE — PROGRESS NOTES
To be completed in Nursing note:    Please reference list for forms that require a visit for completion.  Please remind patients that providers are given 3-5 business days to complete and return forms.      Form type:  [facility] / Consulting Documentation     Date form received:  10/27/2020    Date form completed by Physician:  10-    How was form returned to patient (mailed, faxed, or at  for patient to ):  fax to [phone]       Date form mailed/faxed/left at  for patient and sent to HIM for scanning:  10-          Once form is left for patient, faxed, or mailed PCS will then close the documentation only encounter.

## 2020-11-02 NOTE — PROGRESS NOTES
To be completed in Nursing note:    Please reference list for forms that require a visit for completion.  Please remind patients that providers are given 3-5 business days to complete and return forms.      Form type:  [facility] / Consulting Documentation     Date form received:  10/28/2020    Date form completed by Physician:  2020    How was form returned to patient (mailed, faxed, or at  for patient to ):  fax to [phone]      Date form mailed/faxed/left at  for patient and sent to HIM for scannin2020               Once form is left for patient, faxed, or mailed PCS will then close the documentation only encounter.

## 2020-11-03 NOTE — PROGRESS NOTES
To be completed in Nursing note:    Please reference list for forms that require a visit for completion.  Please remind patients that providers are given 3-5 business days to complete and return forms.      Form type:  [facility] / Consent for Flu Shot so patient can get it at [facility]     Date form received:  10/30/2020         Date form completed by Physician:  2020    How was form returned to patient (mailed, faxed, or at  for patient to ): fax to [phone]          Date form mailed/faxed/left at  for patient and sent to HIM for scannin2020      Once form is left for patient, faxed, or mailed PCS will then close the documentation only encounter.

## 2020-11-09 NOTE — TELEPHONE ENCOUNTER
[facility] phone call message- general phone call:    Reason for call: PT has a home visit today at 11:30 but no one has called or showed up.    Action desired: call back    Return call needed: Yes    OK to leave a message on voice mail? Yes    Advised patient to response may take up to 2 business days: Yes    Primary language: English      needed? No    Call taken on November 9, 2020 at 12:37 PM by Latricia Mercado

## 2020-11-19 NOTE — PROGRESS NOTES
To be completed in Nursing note:    Please reference list for forms that require a visit for completion.  Please remind patients that providers are given 3-5 business days to complete and return forms.      Form type:  [facility] / Physician's Order and STARFACE              Date form received:  2020    Date form completed by Physician:  2020    How was form returned to patient (mailed, faxed, or at  for patient to ):  Will fax to [phone]    Date form mailed/faxed/left at  for patient and sent to HIM for scannin2020        Once form is left for patient, faxed, or mailed PCS will then close the documentation only encounter.

## 2020-11-20 NOTE — TELEPHONE ENCOUNTER
Impression: Nexdtve age-related mclr degn, left eye, early dry stage: H35.3121. Left. Plan: OCT ordered and performed today. Discussed diagnosis with patient, OCT demonstrates the lack of SRF or CME. Treatment is not recommended at this time but we will continue to monitor frequently. The patient was advised to continue AREDS multi-vitamins, monitor the amsler grid closely, monitor vision one eye at a time. Call immediately with any vision changes. Patient agrees with plan. [facility] phone call message- general phone call:    Reason for call: She needs a call back re the order for oxygen they don't  need an order they need chart notes.    Return call needed: Yes    OK to leave a message on voice mail? Yes    Primary language: English      needed? No    Call taken on December 14, 2018 at 9:59 AM by Latricia Mercado

## 2020-11-27 NOTE — PROGRESS NOTES
To be completed in Nursing note:    Please reference list for forms that require a visit for completion.  Please remind patients that providers are given 3-5 business days to complete and return forms.      Form type:  [employer] Health Care Financing Administration / Home Health Certification and Plan of Care    Date form received:  2020    Date form completed by Physician:  2020    How was form returned to patient (mailed, faxed, or at  for patient to ): fax to [phone]    Date form mailed/faxed/left at  for patient and sent to HIM for scannin2020         Once form is left for patient, faxed, or mailed PCS will then close the documentation only encounter.

## 2020-12-22 NOTE — PROGRESS NOTES
To be completed in Nursing note:    Please reference list for forms that require a visit for completion.  Please remind patients that providers are given 3-5 business days to complete and return forms.      Form type:  [facility] : Consulting Documentation.     Date form received:  2020    Date form completed by Physician:  2020    How was form returned to patient (mailed, faxed, or at  for patient to ):  Fax to [phone] and [phone]    Date form mailed/faxed/left at  for patient and sent to HIM for scannin2020           Once form is left for patient, faxed, or mailed PCS will then close the documentation only encounter.

## 2021-01-02 ENCOUNTER — TELEPHONE (OUTPATIENT)
Dept: FAMILY MEDICINE | Facility: CLINIC | Age: 71
End: 2021-01-02

## 2021-01-02 NOTE — TELEPHONE ENCOUNTER
[facility] Answering Service Pager Note    I received an answering service page at 0830 from RN (Sumaya) at [facility]. She stated that yesterday the patient began to experience respiratory distress and then arrest. Nursing staff called 911 and the patient was transferred to [facility] where he unfortunately passed away. No action is needed from nursing staff on our behalf at this time.    I will forward this message on to [facility]'s PCP, Dr. Mack, and our clinic managerial staff.    Von Kevin MD  PGY3  [facility]  Pager: [phone]

## 2021-01-05 ENCOUNTER — DOCUMENTATION ONLY (OUTPATIENT)
Dept: FAMILY MEDICINE | Facility: CLINIC | Age: 71
End: 2021-01-05

## 2021-01-05 NOTE — PROGRESS NOTES
Please reference list for forms that require a visit for completion.  Please remind patients that providers are given 3-5 business days to complete and return forms.        Form type:  [facility] Services : Discharge summary note & Pt transferred to [facility] after respiratory distress and  there.       Date form received:  1/3/2021     Date form completed by Physician:  21    How was form returned to patient (mailed, faxed, or at  for patient to ):  Fax to [phone] and [phone]     Date form mailed/faxed/left at  for patient and sent to HIM for scannin21

## 2021-01-06 ENCOUNTER — MEDICAL CORRESPONDENCE (OUTPATIENT)
Dept: HEALTH INFORMATION MANAGEMENT | Facility: CLINIC | Age: 71
End: 2021-01-06

## 2021-03-15 ENCOUNTER — DOCUMENTATION ONLY (OUTPATIENT)
Dept: FAMILY MEDICINE | Facility: CLINIC | Age: 71
End: 2021-03-15

## 2021-03-15 ENCOUNTER — MEDICAL CORRESPONDENCE (OUTPATIENT)
Dept: HEALTH INFORMATION MANAGEMENT | Facility: CLINIC | Age: 71
End: 2021-03-15

## 2021-03-15 NOTE — PROGRESS NOTES
To be completed in Nursing note:    Please reference list for forms that require a visit for completion.  Please remind patients that providers are given 3-5 business days to complete and return forms.      Form type:  [employer] : Physician Order     Date form received:  2021    Date form completed by Physician:  03/15/2021    How was form returned to patient (mailed, faxed, or at  for patient to ):  Fax to [phone]    Date form mailed/faxed/left at  for patient and sent to HIM for scannin/15/2021        Once form is left for patient, faxed, or mailed PCS will then close the documentation only encounter.

## 2021-11-21 NOTE — TELEPHONE ENCOUNTER
LM with verbal order and written order was faxed. /MONICA Talavera  . /Ilan RN    
[facility] phone call message - order or referral request for patient:     Order or referral being requested: orders for checking CO2 levels.      Additional Comments:         OK to leave a message on voice mail? Yes    Primary language: English      needed? No    Call taken on June 26, 2018 at 3:15 PM by Latricia Mercado      
Verbal order to check CO2 level.    Please notify appropriate persons.    Jonathan Mack MD  
You can access the FollowMyHealth Patient Portal offered by [facility] by registering at the following website: [email or web address]. By joining [facility]’s FollowMyHealth portal, you will also be able to view your health information using other applications (apps) compatible with our system.

## 2023-06-15 NOTE — TELEPHONE ENCOUNTER
How Severe Is Your Rash?: moderate
[facility] phone call message- general phone call:    Reason for call: He has a G tube for meds they need a order to change it so they can get a new one from the supply company. Also someone from there called here on the 17th re the tre and has not heard back about that.    Return call needed: Yes    OK to leave a message on voice mail? Yes    Primary language: English      needed? No    Call taken on January 19, 2018 at 10:50 AM by Latricia Mercado    
They s/w Dr. Mack about orders already./MONICA Salazar    
Is This A New Presentation, Or A Follow-Up?: Rash

## 2025-02-13 NOTE — TELEPHONE ENCOUNTER
[facility] phone call message- patient requesting a refill:    Full Medication Name:     traZODone (DESYREL) 150 MG tablet  Take 1 tablet (150 mg) by mouth At Bedtime - Oral    gabapentin (NEURONTIN) 300 MG capsule  TID    nystatin    Pharmacy confirmed as   [facility] - [place], [place] - [address]  [facility] [address]  Phone: [phone] Fax: [phone]  : Yes    Additional Comments: Needing refills    OK to leave a message on voice mail? Yes    Primary language: English      needed? No    Call taken on September 3, 2019 at 4:52 PM by Steffanie Long CMA   Male No

## 2025-07-28 NOTE — TELEPHONE ENCOUNTER
[facility] phone call message- general phone call:    Reason for call: The home care nurse called to get clarification about a medication      Return call needed: Yes    OK to leave a message on voice mail? Yes    Primary language: English      needed? No    Call taken on September 24, 2019 at 9:36 AM by Pierre Dukes     Patient states is having nausea and dizziness; still has medication, meclizine and ondansetron but only helping minimally.    Accompanied by spouse.    Have you been to the ER, urgent care clinic since your last visit?  Hospitalized since your last visit?   YES - When: approximately 1  weeks ago.  Where and Why: [facility] ED: fall.    Have you seen or consulted any other health care providers outside our system since your last visit?   YES - When: approximately 2  weeks ago.  Where and Why: [facility]: restless legs.       Verbal order read back per Dr. Ribera.  Patient received TDaP vaccine in LEFT deltoid.  Patient tolerated well and left without complaints.  Patient received VIS.      IM      3. Dizziness  diazePAM (VALIUM) 5 MG tablet    BS - [facility] - [facility]